# Patient Record
Sex: FEMALE | Race: WHITE | NOT HISPANIC OR LATINO | Employment: UNEMPLOYED | URBAN - METROPOLITAN AREA
[De-identification: names, ages, dates, MRNs, and addresses within clinical notes are randomized per-mention and may not be internally consistent; named-entity substitution may affect disease eponyms.]

---

## 2017-12-06 ENCOUNTER — HOSPITAL ENCOUNTER (OUTPATIENT)
Dept: LAB | Facility: MEDICAL CENTER | Age: 52
End: 2017-12-06
Attending: FAMILY MEDICINE
Payer: MEDICAID

## 2017-12-06 LAB
ALBUMIN SERPL BCP-MCNC: 4.3 G/DL (ref 3.2–4.9)
ALBUMIN/GLOB SERPL: 1.7 G/DL
ALP SERPL-CCNC: 91 U/L (ref 30–99)
ALT SERPL-CCNC: 15 U/L (ref 2–50)
ANION GAP SERPL CALC-SCNC: 5 MMOL/L (ref 0–11.9)
AST SERPL-CCNC: 18 U/L (ref 12–45)
BILIRUB SERPL-MCNC: 0.4 MG/DL (ref 0.1–1.5)
BUN SERPL-MCNC: 29 MG/DL (ref 8–22)
CALCIUM SERPL-MCNC: 9.8 MG/DL (ref 8.5–10.5)
CHLORIDE SERPL-SCNC: 107 MMOL/L (ref 96–112)
CHOLEST SERPL-MCNC: 137 MG/DL (ref 100–199)
CO2 SERPL-SCNC: 28 MMOL/L (ref 20–33)
CREAT SERPL-MCNC: 1.33 MG/DL (ref 0.5–1.4)
CREAT UR-MCNC: 177.8 MG/DL
EST. AVERAGE GLUCOSE BLD GHB EST-MCNC: 160 MG/DL
GFR SERPL CREATININE-BSD FRML MDRD: 42 ML/MIN/1.73 M 2
GLOBULIN SER CALC-MCNC: 2.5 G/DL (ref 1.9–3.5)
GLUCOSE SERPL-MCNC: 144 MG/DL (ref 65–99)
HBA1C MFR BLD: 7.2 % (ref 0–5.6)
HDLC SERPL-MCNC: 59 MG/DL
HIV 1+2 AB+HIV1 P24 AG SERPL QL IA: NON REACTIVE
LDLC SERPL CALC-MCNC: 54 MG/DL
MICROALBUMIN UR-MCNC: 0.9 MG/DL
MICROALBUMIN/CREAT UR: 5 MG/G (ref 0–30)
POTASSIUM SERPL-SCNC: 4.2 MMOL/L (ref 3.6–5.5)
PROT SERPL-MCNC: 6.8 G/DL (ref 6–8.2)
SODIUM SERPL-SCNC: 140 MMOL/L (ref 135–145)
TREPONEMA PALLIDUM IGG+IGM AB [PRESENCE] IN SERUM OR PLASMA BY IMMUNOASSAY: NON REACTIVE
TRIGL SERPL-MCNC: 120 MG/DL (ref 0–149)
TSH SERPL DL<=0.005 MIU/L-ACNC: 1.49 UIU/ML (ref 0.3–3.7)

## 2017-12-06 PROCEDURE — 82043 UR ALBUMIN QUANTITATIVE: CPT

## 2017-12-06 PROCEDURE — 86780 TREPONEMA PALLIDUM: CPT

## 2017-12-06 PROCEDURE — 83036 HEMOGLOBIN GLYCOSYLATED A1C: CPT

## 2017-12-06 PROCEDURE — 82570 ASSAY OF URINE CREATININE: CPT

## 2017-12-06 PROCEDURE — 84443 ASSAY THYROID STIM HORMONE: CPT

## 2017-12-06 PROCEDURE — 36415 COLL VENOUS BLD VENIPUNCTURE: CPT

## 2017-12-06 PROCEDURE — 87522 HEPATITIS C REVRS TRNSCRPJ: CPT

## 2017-12-06 PROCEDURE — 87389 HIV-1 AG W/HIV-1&-2 AB AG IA: CPT

## 2017-12-06 PROCEDURE — 80061 LIPID PANEL: CPT

## 2017-12-06 PROCEDURE — 80053 COMPREHEN METABOLIC PANEL: CPT

## 2017-12-09 LAB
HCV RNA SERPL NAA+PROBE-ACNC: <15 IU/ML
HCV RNA SERPL NAA+PROBE-LOG IU: <1.2 LOG IU
HCV RNA SERPL QL NAA+PROBE: NOT DETECTED
PATHOLOGY STUDY: NORMAL

## 2017-12-31 ENCOUNTER — HOSPITAL ENCOUNTER (EMERGENCY)
Dept: HOSPITAL 8 - ED | Age: 52
Discharge: HOME | End: 2017-12-31
Payer: MEDICAID

## 2017-12-31 ENCOUNTER — HOSPITAL ENCOUNTER (EMERGENCY)
Facility: MEDICAL CENTER | Age: 52
End: 2017-12-31
Payer: MEDICAID

## 2017-12-31 VITALS
BODY MASS INDEX: 33.81 KG/M2 | HEIGHT: 67 IN | WEIGHT: 215.39 LBS | DIASTOLIC BLOOD PRESSURE: 57 MMHG | TEMPERATURE: 98.3 F | RESPIRATION RATE: 18 BRPM | OXYGEN SATURATION: 96 % | SYSTOLIC BLOOD PRESSURE: 112 MMHG | HEART RATE: 112 BPM

## 2017-12-31 VITALS — SYSTOLIC BLOOD PRESSURE: 104 MMHG | DIASTOLIC BLOOD PRESSURE: 67 MMHG

## 2017-12-31 VITALS — WEIGHT: 215.83 LBS | HEIGHT: 67 IN | BODY MASS INDEX: 33.88 KG/M2

## 2017-12-31 DIAGNOSIS — I89.1: ICD-10-CM

## 2017-12-31 DIAGNOSIS — E11.9: ICD-10-CM

## 2017-12-31 DIAGNOSIS — L03.114: Primary | ICD-10-CM

## 2017-12-31 DIAGNOSIS — I10: ICD-10-CM

## 2017-12-31 DIAGNOSIS — G89.29: ICD-10-CM

## 2017-12-31 PROCEDURE — 96372 THER/PROPH/DIAG INJ SC/IM: CPT

## 2017-12-31 PROCEDURE — 99283 EMERGENCY DEPT VISIT LOW MDM: CPT

## 2017-12-31 PROCEDURE — 302449 STATCHG TRIAGE ONLY (STATISTIC)

## 2017-12-31 RX ORDER — IBUPROFEN 800 MG/1
800 TABLET ORAL EVERY 8 HOURS PRN
Status: SHIPPED | COMMUNITY
End: 2021-10-08

## 2017-12-31 RX ORDER — HYDROCODONE BITARTRATE AND ACETAMINOPHEN 5; 325 MG/1; MG/1
1-2 TABLET ORAL EVERY 4 HOURS PRN
Status: SHIPPED | COMMUNITY
End: 2021-10-08

## 2017-12-31 RX ORDER — TIZANIDINE 4 MG/1
4 TABLET ORAL EVERY 6 HOURS PRN
Status: SHIPPED | COMMUNITY
End: 2022-11-04

## 2017-12-31 ASSESSMENT — PAIN SCALES - GENERAL: PAINLEVEL_OUTOF10: 8

## 2018-01-01 ENCOUNTER — HOSPITAL ENCOUNTER (INPATIENT)
Dept: HOSPITAL 8 - ED | Age: 53
LOS: 4 days | Discharge: HOME | DRG: 603 | End: 2018-01-05
Attending: FAMILY MEDICINE | Admitting: FAMILY MEDICINE
Payer: MEDICAID

## 2018-01-01 VITALS — HEIGHT: 67 IN | BODY MASS INDEX: 36.02 KG/M2 | WEIGHT: 229.5 LBS

## 2018-01-01 VITALS — DIASTOLIC BLOOD PRESSURE: 74 MMHG | SYSTOLIC BLOOD PRESSURE: 116 MMHG

## 2018-01-01 DIAGNOSIS — Z82.49: ICD-10-CM

## 2018-01-01 DIAGNOSIS — Z80.0: ICD-10-CM

## 2018-01-01 DIAGNOSIS — N17.9: ICD-10-CM

## 2018-01-01 DIAGNOSIS — G56.00: ICD-10-CM

## 2018-01-01 DIAGNOSIS — Z98.51: ICD-10-CM

## 2018-01-01 DIAGNOSIS — I10: ICD-10-CM

## 2018-01-01 DIAGNOSIS — G89.29: ICD-10-CM

## 2018-01-01 DIAGNOSIS — Z79.4: ICD-10-CM

## 2018-01-01 DIAGNOSIS — E11.65: ICD-10-CM

## 2018-01-01 DIAGNOSIS — Z80.8: ICD-10-CM

## 2018-01-01 DIAGNOSIS — M19.90: ICD-10-CM

## 2018-01-01 DIAGNOSIS — F17.200: ICD-10-CM

## 2018-01-01 DIAGNOSIS — L03.114: Primary | ICD-10-CM
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
## 2018-01-01 DIAGNOSIS — S69.92XA: ICD-10-CM

## 2018-01-01 DIAGNOSIS — I89.1: ICD-10-CM

## 2018-01-01 DIAGNOSIS — M54.5: ICD-10-CM

## 2018-01-01 DIAGNOSIS — K59.09: ICD-10-CM

## 2018-01-01 DIAGNOSIS — M54.2: ICD-10-CM

## 2018-01-01 DIAGNOSIS — F12.90: ICD-10-CM

## 2018-01-01 LAB
ALBUMIN SERPL-MCNC: 3 G/DL (ref 3.4–5)
ALP SERPL-CCNC: 89 U/L (ref 45–117)
ALT SERPL-CCNC: 30 U/L (ref 12–78)
ANION GAP SERPL CALC-SCNC: 6 MMOL/L (ref 5–15)
BASOPHILS # BLD AUTO: 0.03 X10^3/UL (ref 0–0.1)
BASOPHILS NFR BLD AUTO: 0 % (ref 0–1)
BILIRUB SERPL-MCNC: 0.4 MG/DL (ref 0.2–1)
CALCIUM SERPL-MCNC: 8.4 MG/DL (ref 8.5–10.1)
CHLORIDE SERPL-SCNC: 107 MMOL/L (ref 98–107)
CREAT SERPL-MCNC: 1.44 MG/DL (ref 0.55–1.02)
EOSINOPHIL # BLD AUTO: 0 X10^3/UL (ref 0–0.4)
EOSINOPHIL NFR BLD AUTO: 0 % (ref 1–7)
ERYTHROCYTE [DISTWIDTH] IN BLOOD BY AUTOMATED COUNT: 13.5 % (ref 9.6–15.2)
LYMPHOCYTES # BLD AUTO: 2.11 X10^3/UL (ref 1–3.4)
LYMPHOCYTES NFR BLD AUTO: 17 % (ref 22–44)
MCH RBC QN AUTO: 27.2 PG (ref 27–34.8)
MCHC RBC AUTO-ENTMCNC: 33 G/DL (ref 32.4–35.8)
MCV RBC AUTO: 82.5 FL (ref 80–100)
MD: NO
MONOCYTES # BLD AUTO: 0.61 X10^3/UL (ref 0.2–0.8)
MONOCYTES NFR BLD AUTO: 5 % (ref 2–9)
NEUTROPHILS # BLD AUTO: 9.73 X10^3/UL (ref 1.8–6.8)
NEUTROPHILS NFR BLD AUTO: 78 % (ref 42–75)
PLATELET # BLD AUTO: 181 X10^3/UL (ref 130–400)
PMV BLD AUTO: 9.2 FL (ref 7.4–10.4)
PROT SERPL-MCNC: 6.6 G/DL (ref 6.4–8.2)
RBC # BLD AUTO: 3.99 X10^6/UL (ref 3.82–5.3)

## 2018-01-01 PROCEDURE — 82550 ASSAY OF CK (CPK): CPT

## 2018-01-01 PROCEDURE — 80053 COMPREHEN METABOLIC PANEL: CPT

## 2018-01-01 PROCEDURE — 96374 THER/PROPH/DIAG INJ IV PUSH: CPT

## 2018-01-01 PROCEDURE — 96375 TX/PRO/DX INJ NEW DRUG ADDON: CPT

## 2018-01-01 PROCEDURE — 86140 C-REACTIVE PROTEIN: CPT

## 2018-01-01 PROCEDURE — 87040 BLOOD CULTURE FOR BACTERIA: CPT

## 2018-01-01 PROCEDURE — 82962 GLUCOSE BLOOD TEST: CPT

## 2018-01-01 PROCEDURE — 80202 ASSAY OF VANCOMYCIN: CPT

## 2018-01-01 PROCEDURE — 83605 ASSAY OF LACTIC ACID: CPT

## 2018-01-01 PROCEDURE — 80048 BASIC METABOLIC PNL TOTAL CA: CPT

## 2018-01-01 PROCEDURE — 36415 COLL VENOUS BLD VENIPUNCTURE: CPT

## 2018-01-01 PROCEDURE — 85025 COMPLETE CBC W/AUTO DIFF WBC: CPT

## 2018-01-01 RX ADMIN — SODIUM CHLORIDE SCH MLS/HR: 0.9 INJECTION, SOLUTION INTRAVENOUS at 23:55

## 2018-01-01 NOTE — ED NOTES
Chief Complaint   Patient presents with   • Wound Infection     left hand since yesterday. Worsening this afternoon, extending proximally to mid forearm     +chills. Afebrile, tachycardic otherwise VSS. Explained triage process, to waiting room. Asked to inform RN if questions or concerns arise.

## 2018-01-02 VITALS — DIASTOLIC BLOOD PRESSURE: 54 MMHG | SYSTOLIC BLOOD PRESSURE: 90 MMHG

## 2018-01-02 VITALS — SYSTOLIC BLOOD PRESSURE: 132 MMHG | DIASTOLIC BLOOD PRESSURE: 79 MMHG

## 2018-01-02 VITALS — DIASTOLIC BLOOD PRESSURE: 81 MMHG | SYSTOLIC BLOOD PRESSURE: 132 MMHG

## 2018-01-02 VITALS — SYSTOLIC BLOOD PRESSURE: 119 MMHG | DIASTOLIC BLOOD PRESSURE: 72 MMHG

## 2018-01-02 LAB
ANION GAP SERPL CALC-SCNC: 6 MMOL/L (ref 5–15)
BASOPHILS # BLD AUTO: 0.03 X10^3/UL (ref 0–0.1)
BASOPHILS NFR BLD AUTO: 0 % (ref 0–1)
CALCIUM SERPL-MCNC: 8.3 MG/DL (ref 8.5–10.1)
CHLORIDE SERPL-SCNC: 110 MMOL/L (ref 98–107)
CREAT SERPL-MCNC: 1.43 MG/DL (ref 0.55–1.02)
EOSINOPHIL # BLD AUTO: 0.34 X10^3/UL (ref 0–0.4)
EOSINOPHIL NFR BLD AUTO: 3 % (ref 1–7)
ERYTHROCYTE [DISTWIDTH] IN BLOOD BY AUTOMATED COUNT: 13.8 % (ref 9.6–15.2)
LYMPHOCYTES # BLD AUTO: 3.07 X10^3/UL (ref 1–3.4)
LYMPHOCYTES NFR BLD AUTO: 28 % (ref 22–44)
MCH RBC QN AUTO: 27.3 PG (ref 27–34.8)
MCHC RBC AUTO-ENTMCNC: 32.7 G/DL (ref 32.4–35.8)
MCV RBC AUTO: 83.4 FL (ref 80–100)
MD: NO
MONOCYTES # BLD AUTO: 0.68 X10^3/UL (ref 0.2–0.8)
MONOCYTES NFR BLD AUTO: 6 % (ref 2–9)
NEUTROPHILS # BLD AUTO: 6.92 X10^3/UL (ref 1.8–6.8)
NEUTROPHILS NFR BLD AUTO: 63 % (ref 42–75)
PLATELET # BLD AUTO: 171 X10^3/UL (ref 130–400)
PMV BLD AUTO: 9.2 FL (ref 7.4–10.4)
RBC # BLD AUTO: 4 X10^6/UL (ref 3.82–5.3)

## 2018-01-02 RX ADMIN — NICOTINE SCH PATCH: 7 PATCH, EXTENDED RELEASE TRANSDERMAL at 00:26

## 2018-01-02 RX ADMIN — PREGABALIN SCH MG: 200 CAPSULE ORAL at 00:26

## 2018-01-02 RX ADMIN — SODIUM CHLORIDE SCH MLS/HR: 0.9 INJECTION, SOLUTION INTRAVENOUS at 09:46

## 2018-01-02 RX ADMIN — CEFAZOLIN SODIUM SCH MLS/HR: 1 SOLUTION INTRAVENOUS at 13:49

## 2018-01-02 RX ADMIN — SODIUM CHLORIDE, PRESERVATIVE FREE SCH ML: 5 INJECTION INTRAVENOUS at 21:29

## 2018-01-02 RX ADMIN — NICOTINE SCH PATCH: 7 PATCH, EXTENDED RELEASE TRANSDERMAL at 23:55

## 2018-01-02 RX ADMIN — PREGABALIN SCH MG: 200 CAPSULE ORAL at 21:28

## 2018-01-02 RX ADMIN — INSULIN ASPART SCH UNITS: 100 INJECTION, SOLUTION INTRAVENOUS; SUBCUTANEOUS at 21:00

## 2018-01-02 RX ADMIN — PREGABALIN SCH MG: 150 CAPSULE ORAL at 00:26

## 2018-01-02 RX ADMIN — ENOXAPARIN SODIUM SCH MG: 40 INJECTION SUBCUTANEOUS at 23:55

## 2018-01-02 RX ADMIN — ENOXAPARIN SODIUM SCH MG: 40 INJECTION SUBCUTANEOUS at 00:26

## 2018-01-02 RX ADMIN — PREGABALIN SCH MG: 150 CAPSULE ORAL at 21:28

## 2018-01-02 RX ADMIN — INSULIN ASPART SCH UNITS: 100 INJECTION, SOLUTION INTRAVENOUS; SUBCUTANEOUS at 07:53

## 2018-01-02 RX ADMIN — CEFAZOLIN SODIUM SCH MLS/HR: 1 SOLUTION INTRAVENOUS at 06:16

## 2018-01-02 RX ADMIN — VANCOMYCIN HYDROCHLORIDE SCH MLS/HR: 1 INJECTION, POWDER, LYOPHILIZED, FOR SOLUTION INTRAVENOUS at 23:03

## 2018-01-02 RX ADMIN — INSULIN DETEMIR SCH UNITS: 100 INJECTION, SOLUTION SUBCUTANEOUS at 12:19

## 2018-01-02 RX ADMIN — SODIUM CHLORIDE, PRESERVATIVE FREE SCH ML: 5 INJECTION INTRAVENOUS at 09:00

## 2018-01-02 RX ADMIN — CEFAZOLIN SODIUM SCH MLS/HR: 1 SOLUTION INTRAVENOUS at 22:06

## 2018-01-03 VITALS — DIASTOLIC BLOOD PRESSURE: 82 MMHG | SYSTOLIC BLOOD PRESSURE: 132 MMHG

## 2018-01-03 VITALS — SYSTOLIC BLOOD PRESSURE: 108 MMHG | DIASTOLIC BLOOD PRESSURE: 66 MMHG

## 2018-01-03 VITALS — SYSTOLIC BLOOD PRESSURE: 133 MMHG | DIASTOLIC BLOOD PRESSURE: 88 MMHG

## 2018-01-03 VITALS — SYSTOLIC BLOOD PRESSURE: 120 MMHG | DIASTOLIC BLOOD PRESSURE: 80 MMHG

## 2018-01-03 LAB
ANION GAP SERPL CALC-SCNC: 7 MMOL/L (ref 5–15)
CALCIUM SERPL-MCNC: 8.2 MG/DL (ref 8.5–10.1)
CHLORIDE SERPL-SCNC: 114 MMOL/L (ref 98–107)
CREAT SERPL-MCNC: 0.87 MG/DL (ref 0.55–1.02)

## 2018-01-03 RX ADMIN — INSULIN ASPART SCH UNITS: 100 INJECTION, SOLUTION INTRAVENOUS; SUBCUTANEOUS at 09:00

## 2018-01-03 RX ADMIN — SODIUM CHLORIDE, PRESERVATIVE FREE SCH ML: 5 INJECTION INTRAVENOUS at 20:48

## 2018-01-03 RX ADMIN — INSULIN DETEMIR SCH UNITS: 100 INJECTION, SOLUTION SUBCUTANEOUS at 20:08

## 2018-01-03 RX ADMIN — VANCOMYCIN HYDROCHLORIDE SCH MLS/HR: 1 INJECTION, POWDER, LYOPHILIZED, FOR SOLUTION INTRAVENOUS at 23:02

## 2018-01-03 RX ADMIN — CEFAZOLIN SODIUM SCH MLS/HR: 1 SOLUTION INTRAVENOUS at 06:13

## 2018-01-03 RX ADMIN — PREGABALIN SCH MG: 150 CAPSULE ORAL at 20:47

## 2018-01-03 RX ADMIN — INSULIN ASPART SCH UNITS: 100 INJECTION, SOLUTION INTRAVENOUS; SUBCUTANEOUS at 20:46

## 2018-01-03 RX ADMIN — CEFAZOLIN SODIUM SCH MLS/HR: 1 SOLUTION INTRAVENOUS at 13:58

## 2018-01-03 RX ADMIN — INSULIN DETEMIR SCH UNITS: 100 INJECTION, SOLUTION SUBCUTANEOUS at 00:02

## 2018-01-03 RX ADMIN — SODIUM CHLORIDE, PRESERVATIVE FREE SCH ML: 5 INJECTION INTRAVENOUS at 09:00

## 2018-01-03 RX ADMIN — ENOXAPARIN SODIUM SCH MG: 40 INJECTION SUBCUTANEOUS at 23:39

## 2018-01-03 RX ADMIN — PREGABALIN SCH MG: 200 CAPSULE ORAL at 20:47

## 2018-01-03 RX ADMIN — NICOTINE SCH PATCH: 7 PATCH, EXTENDED RELEASE TRANSDERMAL at 23:39

## 2018-01-03 RX ADMIN — CEFAZOLIN SODIUM SCH MLS/HR: 1 SOLUTION INTRAVENOUS at 21:41

## 2018-01-04 VITALS — DIASTOLIC BLOOD PRESSURE: 85 MMHG | SYSTOLIC BLOOD PRESSURE: 128 MMHG

## 2018-01-04 VITALS — SYSTOLIC BLOOD PRESSURE: 108 MMHG | DIASTOLIC BLOOD PRESSURE: 69 MMHG

## 2018-01-04 VITALS — DIASTOLIC BLOOD PRESSURE: 53 MMHG | SYSTOLIC BLOOD PRESSURE: 134 MMHG

## 2018-01-04 VITALS — DIASTOLIC BLOOD PRESSURE: 53 MMHG | SYSTOLIC BLOOD PRESSURE: 124 MMHG

## 2018-01-04 RX ADMIN — SODIUM CHLORIDE, PRESERVATIVE FREE SCH ML: 5 INJECTION INTRAVENOUS at 09:03

## 2018-01-04 RX ADMIN — PREGABALIN SCH MG: 200 CAPSULE ORAL at 21:58

## 2018-01-04 RX ADMIN — SODIUM CHLORIDE, PRESERVATIVE FREE SCH ML: 5 INJECTION INTRAVENOUS at 21:58

## 2018-01-04 RX ADMIN — INSULIN DETEMIR SCH UNITS: 100 INJECTION, SOLUTION SUBCUTANEOUS at 21:58

## 2018-01-04 RX ADMIN — VANCOMYCIN HYDROCHLORIDE SCH MLS/HR: 1 INJECTION, POWDER, LYOPHILIZED, FOR SOLUTION INTRAVENOUS at 23:55

## 2018-01-04 RX ADMIN — INSULIN ASPART SCH UNITS: 100 INJECTION, SOLUTION INTRAVENOUS; SUBCUTANEOUS at 21:59

## 2018-01-04 RX ADMIN — CEFAZOLIN SODIUM SCH MLS/HR: 1 SOLUTION INTRAVENOUS at 22:07

## 2018-01-04 RX ADMIN — ENOXAPARIN SODIUM SCH MG: 40 INJECTION SUBCUTANEOUS at 23:55

## 2018-01-04 RX ADMIN — CEFAZOLIN SODIUM SCH MLS/HR: 1 SOLUTION INTRAVENOUS at 14:14

## 2018-01-04 RX ADMIN — INSULIN DETEMIR SCH UNITS: 100 INJECTION, SOLUTION SUBCUTANEOUS at 09:02

## 2018-01-04 RX ADMIN — CEFAZOLIN SODIUM SCH MLS/HR: 1 SOLUTION INTRAVENOUS at 06:21

## 2018-01-04 RX ADMIN — PREGABALIN SCH MG: 150 CAPSULE ORAL at 21:58

## 2018-01-04 RX ADMIN — NICOTINE SCH PATCH: 7 PATCH, EXTENDED RELEASE TRANSDERMAL at 23:30

## 2018-01-04 RX ADMIN — INSULIN ASPART SCH UNITS: 100 INJECTION, SOLUTION INTRAVENOUS; SUBCUTANEOUS at 09:00

## 2018-01-05 VITALS — SYSTOLIC BLOOD PRESSURE: 122 MMHG | DIASTOLIC BLOOD PRESSURE: 79 MMHG

## 2018-01-05 VITALS — SYSTOLIC BLOOD PRESSURE: 113 MMHG | DIASTOLIC BLOOD PRESSURE: 74 MMHG

## 2018-01-05 RX ADMIN — CEFAZOLIN SODIUM SCH MLS/HR: 1 SOLUTION INTRAVENOUS at 06:25

## 2018-01-05 RX ADMIN — INSULIN DETEMIR SCH UNITS: 100 INJECTION, SOLUTION SUBCUTANEOUS at 06:58

## 2018-01-05 RX ADMIN — INSULIN ASPART SCH UNITS: 100 INJECTION, SOLUTION INTRAVENOUS; SUBCUTANEOUS at 08:48

## 2018-01-05 RX ADMIN — SODIUM CHLORIDE, PRESERVATIVE FREE SCH ML: 5 INJECTION INTRAVENOUS at 09:00

## 2018-05-11 ENCOUNTER — HOSPITAL ENCOUNTER (OUTPATIENT)
Dept: LAB | Facility: MEDICAL CENTER | Age: 53
End: 2018-05-11
Attending: FAMILY MEDICINE
Payer: MEDICAID

## 2018-05-11 LAB
ALBUMIN SERPL BCP-MCNC: 4 G/DL (ref 3.2–4.9)
ALBUMIN/GLOB SERPL: 1.7 G/DL
ALP SERPL-CCNC: 75 U/L (ref 30–99)
ALT SERPL-CCNC: 20 U/L (ref 2–50)
ANION GAP SERPL CALC-SCNC: 4 MMOL/L (ref 0–11.9)
AST SERPL-CCNC: 22 U/L (ref 12–45)
BILIRUB SERPL-MCNC: 0.4 MG/DL (ref 0.1–1.5)
BUN SERPL-MCNC: 17 MG/DL (ref 8–22)
CALCIUM SERPL-MCNC: 9.2 MG/DL (ref 8.5–10.5)
CHLORIDE SERPL-SCNC: 112 MMOL/L (ref 96–112)
CHOLEST SERPL-MCNC: 149 MG/DL (ref 100–199)
CO2 SERPL-SCNC: 26 MMOL/L (ref 20–33)
CREAT SERPL-MCNC: 0.87 MG/DL (ref 0.5–1.4)
CREAT UR-MCNC: 235.3 MG/DL
CRP SERPL HS-MCNC: 0.3 MG/DL (ref 0–0.75)
ERYTHROCYTE [SEDIMENTATION RATE] IN BLOOD BY WESTERGREN METHOD: 14 MM/HOUR (ref 0–30)
GLOBULIN SER CALC-MCNC: 2.3 G/DL (ref 1.9–3.5)
GLUCOSE SERPL-MCNC: 107 MG/DL (ref 65–99)
HDLC SERPL-MCNC: 44 MG/DL
LDLC SERPL CALC-MCNC: 79 MG/DL
MICROALBUMIN UR-MCNC: <0.7 MG/DL
MICROALBUMIN/CREAT UR: NORMAL MG/G (ref 0–30)
POTASSIUM SERPL-SCNC: 3.7 MMOL/L (ref 3.6–5.5)
PROT SERPL-MCNC: 6.3 G/DL (ref 6–8.2)
RHEUMATOID FACT SER IA-ACNC: <10 IU/ML (ref 0–14)
SODIUM SERPL-SCNC: 142 MMOL/L (ref 135–145)
TRIGL SERPL-MCNC: 131 MG/DL (ref 0–149)
TSH SERPL DL<=0.005 MIU/L-ACNC: 1.18 UIU/ML (ref 0.38–5.33)

## 2018-05-11 PROCEDURE — 83036 HEMOGLOBIN GLYCOSYLATED A1C: CPT

## 2018-05-11 PROCEDURE — 85652 RBC SED RATE AUTOMATED: CPT

## 2018-05-11 PROCEDURE — 82043 UR ALBUMIN QUANTITATIVE: CPT

## 2018-05-11 PROCEDURE — 82570 ASSAY OF URINE CREATININE: CPT

## 2018-05-11 PROCEDURE — 36415 COLL VENOUS BLD VENIPUNCTURE: CPT

## 2018-05-11 PROCEDURE — 84443 ASSAY THYROID STIM HORMONE: CPT

## 2018-05-11 PROCEDURE — 86200 CCP ANTIBODY: CPT

## 2018-05-11 PROCEDURE — 86038 ANTINUCLEAR ANTIBODIES: CPT

## 2018-05-11 PROCEDURE — 86431 RHEUMATOID FACTOR QUANT: CPT

## 2018-05-11 PROCEDURE — 80053 COMPREHEN METABOLIC PANEL: CPT

## 2018-05-11 PROCEDURE — 86140 C-REACTIVE PROTEIN: CPT

## 2018-05-11 PROCEDURE — 80061 LIPID PANEL: CPT

## 2018-05-12 LAB
EST. AVERAGE GLUCOSE BLD GHB EST-MCNC: 223 MG/DL
HBA1C MFR BLD: 9.4 % (ref 0–5.6)

## 2018-05-13 LAB
CCP IGG SERPL-ACNC: 3 UNITS (ref 0–19)
NUCLEAR IGG SER QL IA: NORMAL

## 2019-05-04 ENCOUNTER — HOSPITAL ENCOUNTER (EMERGENCY)
Dept: HOSPITAL 8 - ED | Age: 54
Discharge: HOME | End: 2019-05-04
Payer: MEDICAID

## 2019-05-04 VITALS — WEIGHT: 194.01 LBS | HEIGHT: 66 IN | BODY MASS INDEX: 31.18 KG/M2

## 2019-05-04 VITALS — DIASTOLIC BLOOD PRESSURE: 74 MMHG | SYSTOLIC BLOOD PRESSURE: 123 MMHG

## 2019-05-04 DIAGNOSIS — J20.9: Primary | ICD-10-CM

## 2019-05-04 DIAGNOSIS — F17.200: ICD-10-CM

## 2019-05-04 DIAGNOSIS — R07.89: ICD-10-CM

## 2019-05-04 DIAGNOSIS — G89.29: ICD-10-CM

## 2019-05-04 DIAGNOSIS — I10: ICD-10-CM

## 2019-05-04 DIAGNOSIS — E11.9: ICD-10-CM

## 2019-05-04 LAB
ALBUMIN SERPL-MCNC: 3.2 G/DL (ref 3.4–5)
ALP SERPL-CCNC: 106 U/L (ref 45–117)
ALT SERPL-CCNC: 28 U/L (ref 12–78)
ANION GAP SERPL CALC-SCNC: 8 MMOL/L (ref 5–15)
BASOPHILS # BLD AUTO: 0.02 X10^3/UL (ref 0–0.1)
BASOPHILS NFR BLD AUTO: 0 % (ref 0–1)
BILIRUB SERPL-MCNC: < 0.1 MG/DL (ref 0.2–1)
CALCIUM SERPL-MCNC: 8.1 MG/DL (ref 8.5–10.1)
CHLORIDE SERPL-SCNC: 114 MMOL/L (ref 98–107)
CREAT SERPL-MCNC: 1.07 MG/DL (ref 0.55–1.02)
EOSINOPHIL # BLD AUTO: 0.05 X10^3/UL (ref 0–0.4)
EOSINOPHIL NFR BLD AUTO: 1 % (ref 1–7)
ERYTHROCYTE [DISTWIDTH] IN BLOOD BY AUTOMATED COUNT: 16.4 % (ref 9.6–15.2)
LYMPHOCYTES # BLD AUTO: 1.37 X10^3/UL (ref 1–3.4)
LYMPHOCYTES NFR BLD AUTO: 26 % (ref 22–44)
MCH RBC QN AUTO: 27.7 PG (ref 27–34.8)
MCHC RBC AUTO-ENTMCNC: 33.3 G/DL (ref 32.4–35.8)
MCV RBC AUTO: 83.2 FL (ref 80–100)
MD: NO
MONOCYTES # BLD AUTO: 0.46 X10^3/UL (ref 0.2–0.8)
MONOCYTES NFR BLD AUTO: 9 % (ref 2–9)
NEUTROPHILS # BLD AUTO: 3.34 X10^3/UL (ref 1.8–6.8)
NEUTROPHILS NFR BLD AUTO: 64 % (ref 42–75)
PLATELET # BLD AUTO: 223 X10^3/UL (ref 130–400)
PMV BLD AUTO: 8.7 FL (ref 7.4–10.4)
PROT SERPL-MCNC: 6.1 G/DL (ref 6.4–8.2)
RBC # BLD AUTO: 3.96 X10^6/UL (ref 3.82–5.3)
TROPONIN I SERPL-MCNC: 0.02 NG/ML (ref 0–0.04)

## 2019-05-04 PROCEDURE — 94640 AIRWAY INHALATION TREATMENT: CPT

## 2019-05-04 PROCEDURE — 84484 ASSAY OF TROPONIN QUANT: CPT

## 2019-05-04 PROCEDURE — 93005 ELECTROCARDIOGRAM TRACING: CPT

## 2019-05-04 PROCEDURE — 71046 X-RAY EXAM CHEST 2 VIEWS: CPT

## 2019-05-04 PROCEDURE — 85025 COMPLETE CBC W/AUTO DIFF WBC: CPT

## 2019-05-04 PROCEDURE — 99284 EMERGENCY DEPT VISIT MOD MDM: CPT

## 2019-05-04 PROCEDURE — 80053 COMPREHEN METABOLIC PANEL: CPT

## 2019-05-04 PROCEDURE — 36415 COLL VENOUS BLD VENIPUNCTURE: CPT

## 2019-06-20 ENCOUNTER — HOSPITAL ENCOUNTER (INPATIENT)
Dept: HOSPITAL 8 - ED | Age: 54
LOS: 2 days | Discharge: HOME | DRG: 871 | End: 2019-06-22
Attending: HOSPITALIST | Admitting: HOSPITALIST
Payer: MEDICAID

## 2019-06-20 VITALS — SYSTOLIC BLOOD PRESSURE: 107 MMHG | DIASTOLIC BLOOD PRESSURE: 62 MMHG

## 2019-06-20 VITALS — HEIGHT: 66 IN | BODY MASS INDEX: 30.33 KG/M2 | WEIGHT: 188.72 LBS

## 2019-06-20 DIAGNOSIS — I10: ICD-10-CM

## 2019-06-20 DIAGNOSIS — Z98.84: ICD-10-CM

## 2019-06-20 DIAGNOSIS — Z98.51: ICD-10-CM

## 2019-06-20 DIAGNOSIS — Z90.89: ICD-10-CM

## 2019-06-20 DIAGNOSIS — A41.9: Primary | ICD-10-CM

## 2019-06-20 DIAGNOSIS — D50.9: ICD-10-CM

## 2019-06-20 DIAGNOSIS — N39.0: ICD-10-CM

## 2019-06-20 DIAGNOSIS — Z98.891: ICD-10-CM

## 2019-06-20 DIAGNOSIS — G89.29: ICD-10-CM

## 2019-06-20 DIAGNOSIS — M54.5: ICD-10-CM

## 2019-06-20 DIAGNOSIS — N17.0: ICD-10-CM

## 2019-06-20 DIAGNOSIS — B95.1: ICD-10-CM

## 2019-06-20 DIAGNOSIS — Z88.2: ICD-10-CM

## 2019-06-20 DIAGNOSIS — A60.00: ICD-10-CM

## 2019-06-20 DIAGNOSIS — E11.65: ICD-10-CM

## 2019-06-20 LAB
ALBUMIN SERPL-MCNC: 3.9 G/DL (ref 3.4–5)
ANION GAP SERPL CALC-SCNC: 7 MMOL/L (ref 5–15)
BASOPHILS # BLD AUTO: 0.05 X10^3/UL (ref 0–0.1)
BASOPHILS NFR BLD AUTO: 0 % (ref 0–1)
CALCIUM SERPL-MCNC: 9.3 MG/DL (ref 8.5–10.1)
CHLORIDE SERPL-SCNC: 106 MMOL/L (ref 98–107)
CREAT SERPL-MCNC: 1.18 MG/DL (ref 0.55–1.02)
CULTURE INDICATED?: YES
EOSINOPHIL # BLD AUTO: 0.14 X10^3/UL (ref 0–0.4)
EOSINOPHIL NFR BLD AUTO: 1 % (ref 1–7)
ERYTHROCYTE [DISTWIDTH] IN BLOOD BY AUTOMATED COUNT: 14.3 % (ref 9.6–15.2)
EST. AVERAGE GLUCOSE BLD GHB EST-MCNC: 220 MG/DL (ref 0–126)
HBA1C MFR BLD: 9.3 % (ref 4.2–6.3)
LYMPHOCYTES # BLD AUTO: 3.44 X10^3/UL (ref 1–3.4)
LYMPHOCYTES NFR BLD AUTO: 26 % (ref 22–44)
MCH RBC QN AUTO: 26.4 PG (ref 27–34.8)
MCHC RBC AUTO-ENTMCNC: 31.8 G/DL (ref 32.4–35.8)
MCV RBC AUTO: 82.9 FL (ref 80–100)
MD: NO
MICROSCOPIC: (no result)
MONOCYTES # BLD AUTO: 0.63 X10^3/UL (ref 0.2–0.8)
MONOCYTES NFR BLD AUTO: 5 % (ref 2–9)
NEUTROPHILS # BLD AUTO: 8.84 X10^3/UL (ref 1.8–6.8)
NEUTROPHILS NFR BLD AUTO: 68 % (ref 42–75)
PLATELET # BLD AUTO: 362 X10^3/UL (ref 130–400)
PMV BLD AUTO: 9 FL (ref 7.4–10.4)
RBC # BLD AUTO: 5.46 X10^6/UL (ref 3.82–5.3)

## 2019-06-20 PROCEDURE — 36415 COLL VENOUS BLD VENIPUNCTURE: CPT

## 2019-06-20 PROCEDURE — 81001 URINALYSIS AUTO W/SCOPE: CPT

## 2019-06-20 PROCEDURE — 82962 GLUCOSE BLOOD TEST: CPT

## 2019-06-20 PROCEDURE — 76770 US EXAM ABDO BACK WALL COMP: CPT

## 2019-06-20 PROCEDURE — 87040 BLOOD CULTURE FOR BACTERIA: CPT

## 2019-06-20 PROCEDURE — 96375 TX/PRO/DX INJ NEW DRUG ADDON: CPT

## 2019-06-20 PROCEDURE — 83605 ASSAY OF LACTIC ACID: CPT

## 2019-06-20 PROCEDURE — 87205 SMEAR GRAM STAIN: CPT

## 2019-06-20 PROCEDURE — 87086 URINE CULTURE/COLONY COUNT: CPT

## 2019-06-20 PROCEDURE — 80053 COMPREHEN METABOLIC PANEL: CPT

## 2019-06-20 PROCEDURE — 80048 BASIC METABOLIC PNL TOTAL CA: CPT

## 2019-06-20 PROCEDURE — 87070 CULTURE OTHR SPECIMN AEROBIC: CPT

## 2019-06-20 PROCEDURE — 96376 TX/PRO/DX INJ SAME DRUG ADON: CPT

## 2019-06-20 PROCEDURE — 85025 COMPLETE CBC W/AUTO DIFF WBC: CPT

## 2019-06-20 PROCEDURE — 82040 ASSAY OF SERUM ALBUMIN: CPT

## 2019-06-20 PROCEDURE — 96366 THER/PROPH/DIAG IV INF ADDON: CPT

## 2019-06-20 PROCEDURE — 83036 HEMOGLOBIN GLYCOSYLATED A1C: CPT

## 2019-06-20 PROCEDURE — 96365 THER/PROPH/DIAG IV INF INIT: CPT

## 2019-06-20 RX ADMIN — HYDROCODONE BITARTRATE AND ACETAMINOPHEN PRN TAB: 5; 325 TABLET ORAL at 17:43

## 2019-06-20 RX ADMIN — ACYCLOVIR SCH MG: 800 TABLET ORAL at 20:15

## 2019-06-20 RX ADMIN — PHENAZOPYRIDINE HYDROCHLORIDE SCH MG: 200 TABLET ORAL at 20:15

## 2019-06-20 RX ADMIN — HYDROCODONE BITARTRATE AND ACETAMINOPHEN PRN TAB: 5; 325 TABLET ORAL at 22:14

## 2019-06-20 RX ADMIN — SODIUM CHLORIDE SCH MLS/HR: 0.9 INJECTION, SOLUTION INTRAVENOUS at 19:17

## 2019-06-20 RX ADMIN — HEPARIN SODIUM SCH UNITS: 5000 INJECTION, SOLUTION INTRAVENOUS; SUBCUTANEOUS at 17:43

## 2019-06-20 RX ADMIN — NICOTINE SCH PATCH: 7 PATCH, EXTENDED RELEASE TRANSDERMAL at 16:30

## 2019-06-20 RX ADMIN — INSULIN GLARGINE SCH UNITS: 100 INJECTION, SOLUTION SUBCUTANEOUS at 21:09

## 2019-06-20 RX ADMIN — GABAPENTIN SCH MG: 300 CAPSULE ORAL at 20:15

## 2019-06-21 VITALS — DIASTOLIC BLOOD PRESSURE: 74 MMHG | SYSTOLIC BLOOD PRESSURE: 128 MMHG

## 2019-06-21 VITALS — DIASTOLIC BLOOD PRESSURE: 85 MMHG | SYSTOLIC BLOOD PRESSURE: 136 MMHG

## 2019-06-21 VITALS — SYSTOLIC BLOOD PRESSURE: 132 MMHG | DIASTOLIC BLOOD PRESSURE: 74 MMHG

## 2019-06-21 VITALS — DIASTOLIC BLOOD PRESSURE: 66 MMHG | SYSTOLIC BLOOD PRESSURE: 110 MMHG

## 2019-06-21 LAB
ALBUMIN SERPL-MCNC: 3.1 G/DL (ref 3.4–5)
ALP SERPL-CCNC: 97 U/L (ref 45–117)
ALT SERPL-CCNC: 17 U/L (ref 12–78)
ANION GAP SERPL CALC-SCNC: 5 MMOL/L (ref 5–15)
BASOPHILS # BLD AUTO: 0.06 X10^3/UL (ref 0–0.1)
BASOPHILS NFR BLD AUTO: 1 % (ref 0–1)
BILIRUB SERPL-MCNC: 0.4 MG/DL (ref 0.2–1)
CALCIUM SERPL-MCNC: 8.6 MG/DL (ref 8.5–10.1)
CHLORIDE SERPL-SCNC: 112 MMOL/L (ref 98–107)
CREAT SERPL-MCNC: 0.92 MG/DL (ref 0.55–1.02)
EOSINOPHIL # BLD AUTO: 0.22 X10^3/UL (ref 0–0.4)
EOSINOPHIL NFR BLD AUTO: 2 % (ref 1–7)
ERYTHROCYTE [DISTWIDTH] IN BLOOD BY AUTOMATED COUNT: 14.2 % (ref 9.6–15.2)
LYMPHOCYTES # BLD AUTO: 4.24 X10^3/UL (ref 1–3.4)
LYMPHOCYTES NFR BLD AUTO: 44 % (ref 22–44)
MCH RBC QN AUTO: 27 PG (ref 27–34.8)
MCHC RBC AUTO-ENTMCNC: 31.8 G/DL (ref 32.4–35.8)
MCV RBC AUTO: 84.8 FL (ref 80–100)
MD: NO
MONOCYTES # BLD AUTO: 0.41 X10^3/UL (ref 0.2–0.8)
MONOCYTES NFR BLD AUTO: 4 % (ref 2–9)
NEUTROPHILS # BLD AUTO: 4.7 X10^3/UL (ref 1.8–6.8)
NEUTROPHILS NFR BLD AUTO: 49 % (ref 42–75)
PLATELET # BLD AUTO: 302 X10^3/UL (ref 130–400)
PMV BLD AUTO: 8.8 FL (ref 7.4–10.4)
PROT SERPL-MCNC: 6.3 G/DL (ref 6.4–8.2)
RBC # BLD AUTO: 4.77 X10^6/UL (ref 3.82–5.3)

## 2019-06-21 RX ADMIN — SODIUM CHLORIDE SCH MLS/HR: 0.9 INJECTION, SOLUTION INTRAVENOUS at 19:19

## 2019-06-21 RX ADMIN — ACYCLOVIR SCH MG: 800 TABLET ORAL at 18:11

## 2019-06-21 RX ADMIN — ACYCLOVIR SCH MG: 800 TABLET ORAL at 20:30

## 2019-06-21 RX ADMIN — ONDANSETRON PRN MG: 4 TABLET, ORALLY DISINTEGRATING ORAL at 08:13

## 2019-06-21 RX ADMIN — HYDROCODONE BITARTRATE AND ACETAMINOPHEN PRN TAB: 5; 325 TABLET ORAL at 06:30

## 2019-06-21 RX ADMIN — GABAPENTIN SCH MG: 300 CAPSULE ORAL at 09:13

## 2019-06-21 RX ADMIN — HYDROCODONE BITARTRATE AND ACETAMINOPHEN PRN TAB: 5; 325 TABLET ORAL at 19:19

## 2019-06-21 RX ADMIN — HEPARIN SODIUM SCH UNITS: 5000 INJECTION, SOLUTION INTRAVENOUS; SUBCUTANEOUS at 18:11

## 2019-06-21 RX ADMIN — SODIUM CHLORIDE SCH MLS/HR: 0.9 INJECTION, SOLUTION INTRAVENOUS at 05:36

## 2019-06-21 RX ADMIN — INSULIN GLARGINE SCH UNITS: 100 INJECTION, SOLUTION SUBCUTANEOUS at 09:12

## 2019-06-21 RX ADMIN — HYDROCODONE BITARTRATE AND ACETAMINOPHEN PRN TAB: 5; 325 TABLET ORAL at 14:28

## 2019-06-21 RX ADMIN — CITALOPRAM HYDROBROMIDE SCH MG: 20 TABLET ORAL at 09:13

## 2019-06-21 RX ADMIN — HYDROCODONE BITARTRATE AND ACETAMINOPHEN PRN TAB: 5; 325 TABLET ORAL at 10:28

## 2019-06-21 RX ADMIN — ONDANSETRON PRN MG: 4 TABLET, ORALLY DISINTEGRATING ORAL at 18:11

## 2019-06-21 RX ADMIN — HEPARIN SODIUM SCH UNITS: 5000 INJECTION, SOLUTION INTRAVENOUS; SUBCUTANEOUS at 00:41

## 2019-06-21 RX ADMIN — ACYCLOVIR SCH MG: 800 TABLET ORAL at 14:27

## 2019-06-21 RX ADMIN — ACYCLOVIR SCH MG: 800 TABLET ORAL at 09:13

## 2019-06-21 RX ADMIN — INSULIN GLARGINE SCH UNITS: 100 INJECTION, SOLUTION SUBCUTANEOUS at 20:38

## 2019-06-21 RX ADMIN — PHENAZOPYRIDINE HYDROCHLORIDE SCH MG: 200 TABLET ORAL at 20:30

## 2019-06-21 RX ADMIN — ACYCLOVIR SCH MG: 800 TABLET ORAL at 06:29

## 2019-06-21 RX ADMIN — HEPARIN SODIUM SCH UNITS: 5000 INJECTION, SOLUTION INTRAVENOUS; SUBCUTANEOUS at 09:12

## 2019-06-21 RX ADMIN — GABAPENTIN SCH MG: 300 CAPSULE ORAL at 20:30

## 2019-06-21 RX ADMIN — NICOTINE SCH PATCH: 7 PATCH, EXTENDED RELEASE TRANSDERMAL at 16:30

## 2019-06-21 RX ADMIN — PHENAZOPYRIDINE HYDROCHLORIDE SCH MG: 200 TABLET ORAL at 09:13

## 2019-06-21 RX ADMIN — HYDROCODONE BITARTRATE AND ACETAMINOPHEN PRN TAB: 5; 325 TABLET ORAL at 02:20

## 2019-06-22 VITALS — SYSTOLIC BLOOD PRESSURE: 131 MMHG | DIASTOLIC BLOOD PRESSURE: 78 MMHG

## 2019-06-22 VITALS — SYSTOLIC BLOOD PRESSURE: 127 MMHG | DIASTOLIC BLOOD PRESSURE: 70 MMHG

## 2019-06-22 VITALS — DIASTOLIC BLOOD PRESSURE: 74 MMHG | SYSTOLIC BLOOD PRESSURE: 131 MMHG

## 2019-06-22 LAB
ALBUMIN SERPL-MCNC: 2.9 G/DL (ref 3.4–5)
ALP SERPL-CCNC: 80 U/L (ref 45–117)
ALT SERPL-CCNC: 11 U/L (ref 12–78)
ANION GAP SERPL CALC-SCNC: 3 MMOL/L (ref 5–15)
BASOPHILS # BLD AUTO: 0.05 X10^3/UL (ref 0–0.1)
BASOPHILS NFR BLD AUTO: 1 % (ref 0–1)
BILIRUB SERPL-MCNC: 0.2 MG/DL (ref 0.2–1)
CALCIUM SERPL-MCNC: 8.5 MG/DL (ref 8.5–10.1)
CHLORIDE SERPL-SCNC: 113 MMOL/L (ref 98–107)
CREAT SERPL-MCNC: 0.83 MG/DL (ref 0.55–1.02)
EOSINOPHIL # BLD AUTO: 0.21 X10^3/UL (ref 0–0.4)
EOSINOPHIL NFR BLD AUTO: 3 % (ref 1–7)
ERYTHROCYTE [DISTWIDTH] IN BLOOD BY AUTOMATED COUNT: 14 % (ref 9.6–15.2)
LYMPHOCYTES # BLD AUTO: 3.49 X10^3/UL (ref 1–3.4)
LYMPHOCYTES NFR BLD AUTO: 47 % (ref 22–44)
MCH RBC QN AUTO: 27 PG (ref 27–34.8)
MCHC RBC AUTO-ENTMCNC: 31.9 G/DL (ref 32.4–35.8)
MCV RBC AUTO: 84.7 FL (ref 80–100)
MD: NO
MONOCYTES # BLD AUTO: 0.34 X10^3/UL (ref 0.2–0.8)
MONOCYTES NFR BLD AUTO: 5 % (ref 2–9)
NEUTROPHILS # BLD AUTO: 3.33 X10^3/UL (ref 1.8–6.8)
NEUTROPHILS NFR BLD AUTO: 45 % (ref 42–75)
PLATELET # BLD AUTO: 257 X10^3/UL (ref 130–400)
PMV BLD AUTO: 8.9 FL (ref 7.4–10.4)
PROT SERPL-MCNC: 5.8 G/DL (ref 6.4–8.2)
RBC # BLD AUTO: 4.27 X10^6/UL (ref 3.82–5.3)

## 2019-06-22 RX ADMIN — GABAPENTIN SCH MG: 300 CAPSULE ORAL at 09:07

## 2019-06-22 RX ADMIN — CITALOPRAM HYDROBROMIDE SCH MG: 20 TABLET ORAL at 09:07

## 2019-06-22 RX ADMIN — HEPARIN SODIUM SCH UNITS: 5000 INJECTION, SOLUTION INTRAVENOUS; SUBCUTANEOUS at 09:07

## 2019-06-22 RX ADMIN — ONDANSETRON PRN MG: 4 TABLET, ORALLY DISINTEGRATING ORAL at 00:45

## 2019-06-22 RX ADMIN — ACYCLOVIR SCH APPLIC: 50 OINTMENT TOPICAL at 14:00

## 2019-06-22 RX ADMIN — HYDROCODONE BITARTRATE AND ACETAMINOPHEN PRN TAB: 5; 325 TABLET ORAL at 09:07

## 2019-06-22 RX ADMIN — HEPARIN SODIUM SCH UNITS: 5000 INJECTION, SOLUTION INTRAVENOUS; SUBCUTANEOUS at 00:37

## 2019-06-22 RX ADMIN — PHENAZOPYRIDINE HYDROCHLORIDE SCH MG: 200 TABLET ORAL at 09:07

## 2019-06-22 RX ADMIN — HYDROCODONE BITARTRATE AND ACETAMINOPHEN PRN TAB: 5; 325 TABLET ORAL at 14:21

## 2019-06-22 RX ADMIN — ACYCLOVIR SCH APPLIC: 50 OINTMENT TOPICAL at 14:21

## 2019-06-22 RX ADMIN — ACYCLOVIR SCH MG: 800 TABLET ORAL at 05:12

## 2019-06-22 RX ADMIN — ACYCLOVIR SCH MG: 800 TABLET ORAL at 09:07

## 2019-06-22 RX ADMIN — HYDROCODONE BITARTRATE AND ACETAMINOPHEN PRN TAB: 5; 325 TABLET ORAL at 05:12

## 2019-06-22 RX ADMIN — HYDROCODONE BITARTRATE AND ACETAMINOPHEN PRN TAB: 5; 325 TABLET ORAL at 00:37

## 2021-10-08 ENCOUNTER — OFFICE VISIT (OUTPATIENT)
Dept: MEDICAL GROUP | Facility: CLINIC | Age: 56
End: 2021-10-08
Payer: MEDICAID

## 2021-10-08 VITALS
RESPIRATION RATE: 18 BRPM | OXYGEN SATURATION: 99 % | DIASTOLIC BLOOD PRESSURE: 87 MMHG | BODY MASS INDEX: 29.11 KG/M2 | WEIGHT: 185.5 LBS | HEART RATE: 115 BPM | TEMPERATURE: 97.6 F | SYSTOLIC BLOOD PRESSURE: 141 MMHG | HEIGHT: 67 IN

## 2021-10-08 DIAGNOSIS — E11.42 TYPE 2 DM WITH DIABETIC NEUROPATHY AFFECTING BOTH SIDES OF BODY (HCC): ICD-10-CM

## 2021-10-08 DIAGNOSIS — G62.9 NEUROPATHY: ICD-10-CM

## 2021-10-08 DIAGNOSIS — Z00.00 LABORATORY EXAM ORDERED AS PART OF ROUTINE GENERAL MEDICAL EXAMINATION: ICD-10-CM

## 2021-10-08 DIAGNOSIS — I10 PRIMARY HYPERTENSION: ICD-10-CM

## 2021-10-08 PROCEDURE — 99214 OFFICE O/P EST MOD 30 MIN: CPT | Performed by: STUDENT IN AN ORGANIZED HEALTH CARE EDUCATION/TRAINING PROGRAM

## 2021-10-08 RX ORDER — SIMVASTATIN 20 MG
20 TABLET ORAL EVERY EVENING
Qty: 30 TABLET | Refills: 11 | Status: SHIPPED | DISCHARGE
Start: 2021-10-08 | End: 2022-05-17 | Stop reason: SDUPTHER

## 2021-10-08 RX ORDER — INSULIN GLARGINE 100 [IU]/ML
20 INJECTION, SOLUTION SUBCUTANEOUS
Status: SHIPPED | DISCHARGE
Start: 2021-10-08 | End: 2022-05-17

## 2021-10-08 RX ORDER — GABAPENTIN 400 MG/1
400 CAPSULE ORAL 3 TIMES DAILY
Qty: 90 CAPSULE | Refills: 1 | Status: SHIPPED | OUTPATIENT
Start: 2021-10-08 | End: 2022-02-27

## 2021-10-08 RX ORDER — CITALOPRAM 40 MG/1
40 TABLET ORAL DAILY
Qty: 30 TABLET | Refills: 1 | Status: SHIPPED | OUTPATIENT
Start: 2021-10-08 | End: 2022-01-21

## 2021-10-08 RX ORDER — PEN NEEDLE, DIABETIC 31 G X1/4"
1 NEEDLE, DISPOSABLE MISCELLANEOUS 3 TIMES DAILY
Qty: 90 EACH | Refills: 3 | Status: SHIPPED | OUTPATIENT
Start: 2021-10-08 | End: 2022-05-17

## 2021-10-08 RX ORDER — LISINOPRIL 10 MG/1
10 TABLET ORAL DAILY
Qty: 30 TABLET | Refills: 1 | Status: SHIPPED | OUTPATIENT
Start: 2021-10-08 | End: 2022-05-17 | Stop reason: SDUPTHER

## 2021-10-08 RX ORDER — DULAGLUTIDE 0.75 MG/.5ML
0.5 INJECTION, SOLUTION SUBCUTANEOUS
Qty: 1.96 ML | Refills: 1 | Status: SHIPPED | OUTPATIENT
Start: 2021-10-08 | End: 2021-12-15 | Stop reason: SDUPTHER

## 2021-10-08 RX ORDER — ALBUTEROL SULFATE 90 UG/1
2 AEROSOL, METERED RESPIRATORY (INHALATION) EVERY 4 HOURS PRN
Qty: 1 EACH | Status: SHIPPED | DISCHARGE
Start: 2021-10-08 | End: 2022-11-04 | Stop reason: SDUPTHER

## 2021-10-08 ASSESSMENT — PATIENT HEALTH QUESTIONNAIRE - PHQ9
CLINICAL INTERPRETATION OF PHQ2 SCORE: 2
5. POOR APPETITE OR OVEREATING: 0 - NOT AT ALL
SUM OF ALL RESPONSES TO PHQ QUESTIONS 1-9: 7

## 2021-10-08 NOTE — PATIENT INSTRUCTIONS
- Stop Humalog   - Decrease lantus to 15 units at night  - Start trulicity 0.75 mg once weekly    - Stop prozac  - Start celexa

## 2021-10-08 NOTE — PROGRESS NOTES
[facility]    Chief Complaint   Patient presents with   • Medication Refill     med refills     HISTORY OF PRESENT ILLNESS: Patient is a 55 y.o. female established patient who presents today to discuss the medical issues below:    Depression: Started prozac 40 mg PO daily. Wants to switch back to celexa.  She feels she had better response to Celexa.  Denies SI/HI.    HTN: Off of BP meds for weeks. denies chest pain, shortness breath, abdominal pain, nausea, emesis, swelling, or other complaints.    Neuropathy: 300 mg Po TiD. patient reports neuropathy is severe and uncontrolled.  In the hands and bilateral feet.     D2DM: Working on diet. Cut out soda. Check glucose infrequently. Last one was 101. Backing off of short acting insulin. Lantis 22-24 qhs. Lispro 8-10 u before dinner.          No problems updated.     Patient Active Problem List    Diagnosis Date Noted   • Dehydration 08/17/2015       Allergies:Patient has no known allergies.    Current Outpatient Medications   Medication Sig Dispense Refill   • ibuprofen (MOTRIN) 800 MG Tab Take 800 mg by mouth every 8 hours as needed.     • hydrocodone-acetaminophen (NORCO) 5-325 MG Tab per tablet Take 1-2 Tabs by mouth every four hours as needed.     • tizanidine (ZANAFLEX) 4 MG Tab Take 4 mg by mouth every 6 hours as needed.     • insulin glargine (LANTUS) 100 UNIT/ML Solution Inject 20 Units as instructed 2 times a day. 10 mL 3   • lisinopril-hydrochlorothiazide (PRINZIDE, ZESTORETIC) 20-12.5 MG per tablet Take 2 Tabs by mouth every day.     • citalopram (CELEXA) 20 MG Tab Take 20 mg by mouth 3 times a day.       No current facility-administered medications for this visit.         Past Medical History:   Diagnosis Date   • Diabetes (CMS-[facility]) ([facility])    • Diabetic neuropathy, painful (CMS-[facility]) ([facility])    • Hypertension        Social History     Tobacco Use   • Smoking status: Current Every Day Smoker     Packs/day: 0.25   Substance Use Topics   • Alcohol use:  "Yes   • Drug use: No       No family status information on file.   No family history on file.    ROS:  Negative except as stated above.      Exam:   /87 (BP Location: Left arm, Patient Position: Sitting, BP Cuff Size: Adult)   Pulse (!) 115   Temp 36.4 °C (97.6 °F) (Temporal)   Resp 18   Ht 1.702 m (5' 7\")   Wt 84.1 kg (185 lb 8 oz)   SpO2 99%  Body mass index is 29.05 kg/m².  General:  Well nourished, well developed female in NAD.  HENT: MMM, EOMI   Neck: No JVD  Pulmonary: Clear to ausculation.  Normal effort. No rales, rhonchi, or wheezing.  Cardiovascular: Regular rate and rhythm without murmur.   Abdomen: Normal bowel sounds, soft and nontender, no palpable liver, spleen, or masses.  Extremities: No LE edema noted. 5/5 strength in all extremities  Neuro: Grossly nonfocal.  Psych: Alert and oriented to person, place, and time. Appropriate mood and conversation.      Assessment/Plan:    #Depression  -Patient reports poor control of symptoms on Prozac.  Discussed switching back to Celexa.  -Stop Prozac  -Start Celexa 40 mg p.o. daily  -Return to clinic in 1 month    #Hypertension, uncontrolled  -Patient reports she ran out of her lisinopril approximately 2 weeks ago and is currently not on that medication.  She does not check her blood pressure at home, however, her blood pressure is elevated in office today at 141/87.  -Resume lisinopril 10 mg p.o. daily    #Type 2 diabetes  -Patient has poorly controlled type 2 diabetes.  She is noncompliant with glucose monitoring.  She makes adjustments to her regimen based off of how she feels.  Discussed options.  I feel like a GLP-1 agonist would benefit this patient.  -Stop lispro  -Decrease Lantus to 15 units nightly  -Start Trulicity 0.75 mg subcu weekly  -Check morning fasting glucose.  -I discussed at length the risk of hypoglycemia, including but not limited to, permanent brain damage and death.  Patient expressed understanding.  -Return to clinic in 1 " month with glucose log.    #Peripheral neuropathy  -Patient reports her neuropathy is poorly controlled.  Would like better relief.  Discussed options.  -Increase gabapentin to 400 mg p.o. 3 times daily  -Return to clinic in 1 month.

## 2021-12-16 RX ORDER — DULAGLUTIDE 0.75 MG/.5ML
0.5 INJECTION, SOLUTION SUBCUTANEOUS
Qty: 1.96 ML | Refills: 1 | Status: SHIPPED | OUTPATIENT
Start: 2021-12-16 | End: 2022-01-11 | Stop reason: SDUPTHER

## 2021-12-21 ENCOUNTER — TELEPHONE (OUTPATIENT)
Dept: MEDICAL GROUP | Facility: CLINIC | Age: 56
End: 2021-12-21

## 2021-12-21 RX ORDER — LIRAGLUTIDE 6 MG/ML
INJECTION SUBCUTANEOUS
Qty: 9 ML | Refills: 1 | Status: SHIPPED | OUTPATIENT
Start: 2021-12-21 | End: 2022-01-11

## 2021-12-21 NOTE — TELEPHONE ENCOUNTER
Caller Name: Chantale Greer    Call Back Number: [phone] (home)       Patient called about her trulicity. I saw that it was sent it, I called the pharmacy to see what was going on. Patients insurance will no longer cover trulicity, It looks like she has an appointment with you soon, but her sugars have been pretty high.. So she will need a new insulin..

## 2021-12-22 ENCOUNTER — TELEPHONE (OUTPATIENT)
Dept: SCHEDULING | Facility: IMAGING CENTER | Age: 56
End: 2021-12-22

## 2021-12-22 NOTE — TELEPHONE ENCOUNTER
I called and let her know that you sent in victoza. I will follow up from here to see if her insurance covered it

## 2021-12-23 ENCOUNTER — TELEPHONE (OUTPATIENT)
Dept: MEDICAL GROUP | Facility: CLINIC | Age: 56
End: 2021-12-23
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
## 2021-12-28 ENCOUNTER — APPOINTMENT (OUTPATIENT)
Dept: MEDICAL GROUP | Facility: CLINIC | Age: 56
End: 2021-12-28
Payer: MEDICAID

## 2022-01-11 RX ORDER — DULAGLUTIDE 0.75 MG/.5ML
0.5 INJECTION, SOLUTION SUBCUTANEOUS
Qty: 1.96 ML | Refills: 1 | Status: SHIPPED | OUTPATIENT
Start: 2022-01-11 | End: 2022-05-17

## 2022-01-11 NOTE — TELEPHONE ENCOUNTER
Received request via: Pharmacy    Was the patient seen in the last year in this department? Yes    Does the patient have an active prescription (recently filled or refills available) for medication(s) requested? No     PREVIOUS SCRIPT WAS NOT SENT WITH ICD-10 CODE. PLEASE RESEND WITH CORRECT DX TO [facility]

## 2022-01-17 ENCOUNTER — TELEPHONE (OUTPATIENT)
Dept: MEDICAL GROUP | Facility: CLINIC | Age: 57
End: 2022-01-17

## 2022-01-18 NOTE — TELEPHONE ENCOUNTER
Pt had scheduling issue today and was not able to make her appointment. I was notified she would like to speak to me regarding dental pain. Called and left message for call back.

## 2022-01-21 RX ORDER — CITALOPRAM 40 MG/1
TABLET ORAL
Qty: 30 TABLET | Refills: 0 | Status: SHIPPED | OUTPATIENT
Start: 2022-01-21 | End: 2022-02-27

## 2022-02-07 ENCOUNTER — TELEPHONE (OUTPATIENT)
Dept: SCHEDULING | Facility: IMAGING CENTER | Age: 57
End: 2022-02-07

## 2022-02-24 ENCOUNTER — OFFICE VISIT (OUTPATIENT)
Dept: MEDICAL GROUP | Facility: CLINIC | Age: 57
End: 2022-02-24
Payer: MEDICAID

## 2022-02-24 VITALS
WEIGHT: 177 LBS | OXYGEN SATURATION: 94 % | RESPIRATION RATE: 16 BRPM | TEMPERATURE: 99 F | SYSTOLIC BLOOD PRESSURE: 111 MMHG | HEART RATE: 107 BPM | BODY MASS INDEX: 27.78 KG/M2 | HEIGHT: 67 IN | DIASTOLIC BLOOD PRESSURE: 72 MMHG

## 2022-02-24 DIAGNOSIS — M25.559 HIP PAIN: ICD-10-CM

## 2022-02-24 DIAGNOSIS — K08.89 PAIN, DENTAL: ICD-10-CM

## 2022-02-24 PROCEDURE — 99213 OFFICE O/P EST LOW 20 MIN: CPT | Mod: GE | Performed by: STUDENT IN AN ORGANIZED HEALTH CARE EDUCATION/TRAINING PROGRAM

## 2022-02-24 RX ORDER — CYCLOBENZAPRINE HCL 5 MG
5-10 TABLET ORAL 3 TIMES DAILY PRN
Qty: 30 TABLET | Refills: 0 | Status: SHIPPED | OUTPATIENT
Start: 2022-02-24 | End: 2022-05-17 | Stop reason: SDUPTHER

## 2022-02-25 NOTE — PROGRESS NOTES
Subjective:     CC: dental pain    HPI:   Chantale presents today with chief concern of dental pain. Patient has been to oral surgeon and plan is for extraction of bottom teeth. She is requiring note stating medically cleared for this procedure. She also notes chronic hip pain. She notes that flexeril has helped in the past. She is wondering if injections could help.     Problem   Pain, Dental    Patient requiring medical clearance for oral surgeon to get teeth pulled. We will check an A1c and provide a letter for medical clearance.      Hip Pain    Bilateral hip pain. Wondering if hip could be injected.          Current Outpatient Medications Ordered in Epic   Medication Sig Dispense Refill   • cyclobenzaprine (FLEXERIL) 5 mg tablet Take 1-2 Tablets by mouth 3 times a day as needed for Mild Pain, Moderate Pain or Muscle Spasms. 30 Tablet 0   • citalopram (CELEXA) 40 MG Tab Take 1 tablet by mouth once daily 30 Tablet 0   • Dulaglutide (TRULICITY) 0.75 MG/0.5ML Solution Pen-injector Inject 0.5 mL under the skin every 7 days. 1.96 mL 1   • gabapentin (NEURONTIN) 400 MG Cap Take 1 Capsule by mouth 3 times a day. 90 Capsule 1   • insulin glargine (LANTUS) 100 UNIT/ML Solution Inject 20 Units under the skin at bedtime.     • lisinopril (PRINIVIL) 10 MG Tab Take 1 Tablet by mouth every day. 30 Tablet 1   • albuterol 108 (90 Base) MCG/ACT Aero Soln inhalation aerosol Inhale 2 Puffs every four hours as needed for Shortness of Breath. 1 Each    • simvastatin (ZOCOR) 20 MG Tab Take 1 Tablet by mouth every evening. 30 Tablet 11   • Insulin Pen Needle (PEN NEEDLES) 31G X 6 MM Misc 1 Package 3 times a day. 90 Each 3   • tizanidine (ZANAFLEX) 4 MG Tab Take 4 mg by mouth every 6 hours as needed.       No current Epic-ordered facility-administered medications on file.     ROS:  Gen: no fevers/chills, no changes in weight  Eyes: no changes in vision  ENT: no sore throat, no hearing loss, no bloody nose  Pulm: no sob, no cough  CV:  "no chest pain, no palpitations  GI: no nausea/vomiting, no diarrhea  : no dysuria  MSk: yes bilateral hip pain  Skin: no rash  Neuro: no headaches, no numbness/tingling  Heme/Lymph: no easy bruising      Objective:     Exam:  /72 (BP Location: Left arm, Patient Position: Sitting, BP Cuff Size: Adult)   Pulse (!) 107   Temp 37.2 °C (99 °F) (Tympanic)   Resp 16   Ht 1.702 m (5' 7\")   Wt 80.3 kg (177 lb)   SpO2 94%   BMI 27.72 kg/m²  Body mass index is 27.72 kg/m².    Gen: Alert and oriented, No apparent distress.  HEENT: poor dentition, missing multiple teeth, no abscess  Lungs: Normal effort, CTA bilaterally, no wheezes, rhonchi, or rales  CV: Regular rate and rhythm. No murmurs, rubs, or gallops.  Ext: No clubbing, cyanosis, edema.    Assessment & Plan:     56 y.o. female with the following -     Problem List Items Addressed This Visit     Pain, dental     Check A1c. Once this is completed will complete letter for medical clearance.          Relevant Orders    HEMOGLOBIN A1C    Hip pain     Short course of flexeril. Referral to sports medicine for potential hip injection.          Relevant Medications    cyclobenzaprine (FLEXERIL) 5 mg tablet    Other Relevant Orders    Referral to Sports Medicine            No follow-ups on file.    "

## 2022-02-27 RX ORDER — GABAPENTIN 400 MG/1
CAPSULE ORAL
Qty: 90 CAPSULE | Refills: 0 | Status: SHIPPED | OUTPATIENT
Start: 2022-02-27 | End: 2022-03-24

## 2022-02-27 RX ORDER — CITALOPRAM 40 MG/1
TABLET ORAL
Qty: 30 TABLET | Refills: 0 | Status: SHIPPED | OUTPATIENT
Start: 2022-02-27 | End: 2022-05-17 | Stop reason: SDUPTHER

## 2022-03-04 ENCOUNTER — TELEPHONE (OUTPATIENT)
Dept: MEDICAL GROUP | Facility: CLINIC | Age: 57
End: 2022-03-04
Payer: MEDICAID

## 2022-03-04 NOTE — TELEPHONE ENCOUNTER
Caller Name: Chantale Greer    Call Back Number: [phone] (home)       Patient called and still has an abcess in her tooth, she is wondering if you can send in some antibiotics for her.

## 2022-03-24 RX ORDER — GABAPENTIN 400 MG/1
CAPSULE ORAL
Qty: 90 CAPSULE | Refills: 0 | Status: SHIPPED | OUTPATIENT
Start: 2022-03-24 | End: 2022-05-17

## 2022-05-17 ENCOUNTER — OFFICE VISIT (OUTPATIENT)
Dept: MEDICAL GROUP | Facility: CLINIC | Age: 57
End: 2022-05-17
Payer: MEDICAID

## 2022-05-17 ENCOUNTER — APPOINTMENT (OUTPATIENT)
Dept: RADIOLOGY | Facility: CLINIC | Age: 57
End: 2022-05-17
Attending: STUDENT IN AN ORGANIZED HEALTH CARE EDUCATION/TRAINING PROGRAM
Payer: MEDICAID

## 2022-05-17 VITALS
BODY MASS INDEX: 28.56 KG/M2 | HEART RATE: 94 BPM | DIASTOLIC BLOOD PRESSURE: 89 MMHG | HEIGHT: 67 IN | RESPIRATION RATE: 16 BRPM | WEIGHT: 182 LBS | SYSTOLIC BLOOD PRESSURE: 126 MMHG | OXYGEN SATURATION: 98 %

## 2022-05-17 DIAGNOSIS — I10 PRIMARY HYPERTENSION: ICD-10-CM

## 2022-05-17 DIAGNOSIS — G89.29 CHRONIC BILATERAL LOW BACK PAIN WITH BILATERAL SCIATICA: ICD-10-CM

## 2022-05-17 DIAGNOSIS — E11.42 TYPE 2 DIABETES MELLITUS WITH DIABETIC POLYNEUROPATHY, WITHOUT LONG-TERM CURRENT USE OF INSULIN (HCC): ICD-10-CM

## 2022-05-17 DIAGNOSIS — M54.42 CHRONIC BILATERAL LOW BACK PAIN WITH BILATERAL SCIATICA: ICD-10-CM

## 2022-05-17 DIAGNOSIS — M79.642 PAIN IN BOTH HANDS: ICD-10-CM

## 2022-05-17 DIAGNOSIS — M54.41 CHRONIC BILATERAL LOW BACK PAIN WITH BILATERAL SCIATICA: ICD-10-CM

## 2022-05-17 DIAGNOSIS — F32.A DEPRESSION, UNSPECIFIED DEPRESSION TYPE: ICD-10-CM

## 2022-05-17 DIAGNOSIS — M79.641 PAIN IN BOTH HANDS: ICD-10-CM

## 2022-05-17 DIAGNOSIS — M25.559 HIP PAIN: ICD-10-CM

## 2022-05-17 PROBLEM — M54.9 BACK PAIN: Status: ACTIVE | Noted: 2022-05-17

## 2022-05-17 LAB
HBA1C MFR BLD: 9.6 % (ref 0–5.6)
INT CON NEG: ABNORMAL
INT CON POS: ABNORMAL

## 2022-05-17 PROCEDURE — 99214 OFFICE O/P EST MOD 30 MIN: CPT | Mod: GC | Performed by: STUDENT IN AN ORGANIZED HEALTH CARE EDUCATION/TRAINING PROGRAM

## 2022-05-17 PROCEDURE — 83036 HEMOGLOBIN GLYCOSYLATED A1C: CPT | Performed by: STUDENT IN AN ORGANIZED HEALTH CARE EDUCATION/TRAINING PROGRAM

## 2022-05-17 PROCEDURE — 77077 JOINT SURVEY SINGLE VIEW: CPT | Mod: TC,LT | Performed by: FAMILY MEDICINE

## 2022-05-17 RX ORDER — GABAPENTIN 600 MG/1
600 TABLET ORAL 3 TIMES DAILY
Qty: 270 TABLET | Refills: 3 | Status: SHIPPED | OUTPATIENT
Start: 2022-05-17 | End: 2022-09-08 | Stop reason: SDUPTHER

## 2022-05-17 RX ORDER — EMPAGLIFLOZIN 10 MG/1
10 TABLET, FILM COATED ORAL DAILY
Qty: 30 TABLET | Refills: 3 | Status: SHIPPED | OUTPATIENT
Start: 2022-05-17 | End: 2022-11-04

## 2022-05-17 RX ORDER — SIMVASTATIN 20 MG
20 TABLET ORAL EVERY EVENING
Qty: 90 TABLET | Refills: 3 | Status: SHIPPED | OUTPATIENT
Start: 2022-05-17 | End: 2022-09-08 | Stop reason: SDUPTHER

## 2022-05-17 RX ORDER — CYCLOBENZAPRINE HCL 5 MG
5-10 TABLET ORAL 3 TIMES DAILY PRN
Qty: 30 TABLET | Refills: 0 | Status: SHIPPED | OUTPATIENT
Start: 2022-05-17 | End: 2022-11-04 | Stop reason: SDUPTHER

## 2022-05-17 RX ORDER — CITALOPRAM 40 MG/1
40 TABLET ORAL DAILY
Qty: 90 TABLET | Refills: 3 | Status: SHIPPED | OUTPATIENT
Start: 2022-05-17 | End: 2022-09-08 | Stop reason: SDUPTHER

## 2022-05-17 RX ORDER — DULAGLUTIDE 1.5 MG/.5ML
0.5 INJECTION, SOLUTION SUBCUTANEOUS
Qty: 2.24 ML | Refills: 3 | Status: SHIPPED | OUTPATIENT
Start: 2022-05-17 | End: 2022-05-17

## 2022-05-17 RX ORDER — LISINOPRIL 10 MG/1
10 TABLET ORAL DAILY
Qty: 90 TABLET | Refills: 3 | Status: SHIPPED | OUTPATIENT
Start: 2022-05-17 | End: 2022-09-08 | Stop reason: SDUPTHER

## 2022-05-17 ASSESSMENT — PATIENT HEALTH QUESTIONNAIRE - PHQ9: CLINICAL INTERPRETATION OF PHQ2 SCORE: 0

## 2022-05-17 NOTE — LETTER
May 17, 2022      To whom it may concern,            Please accommodate Chantale Greer's emotional support cat Elier.                                Jaun Saldivar M.D.

## 2022-05-17 NOTE — LETTER
May 17, 2022    To whom it may concern,     Based on my evaluation today, 5/17/2022, Chantale Greer is cleared for dental surgery.                                    Jaun Saldivar M.D.

## 2022-05-17 NOTE — ASSESSMENT & PLAN NOTE
Chronic, deteriorated    56-year-old female with chronic low back pain and bilateral sciatica who requests refill on cyclobenzaprine.  She reports this did work significantly for her in the past.  I discussed ibuprofen for symptomatic relief as well.    Plan:  - Ibuprofen OTC as needed pain  - Cyclobenzaprine 10 mg p.o. daily as needed muscle spasms

## 2022-05-21 NOTE — PROGRESS NOTES
[facility]    Chief Complaint   Patient presents with   • Follow-Up     Med refill,referral to oral surgeon        HISTORY OF PRESENT ILLNESS: Patient is a 56 y.o. female established patient who presents today to discuss the medical issues below:    Problem   Type 2 Diabetes Mellitus With Diabetic Polyneuropathy, Without Long-Term Current Use of Insulin ([facility])    Patient with type 2 diabetes.  She is very noncompliant with her insulin.  Last visit I started Trulicity.  She has been compliant with Trulicity and denies any side effects.  She is not taking Lantus.  Not checking blood sugar at home.     Back Pain    Pt has hx of chronic back pain. Recently flared. Having bilateral psyatic symptoms.  No numbness, weakness, or incontinence.  Patient previously given cyclobenzaprine with good response.     Pain in Both Hands    Patient with partly arthralgia.  Would like work-up for possible psoriatic arthritis.  Her mother has psoriatic arthritis.          Patient Active Problem List    Diagnosis Date Noted   • Depression 05/17/2022   • Type 2 diabetes mellitus with diabetic polyneuropathy, without long-term current use of insulin ([facility]) 05/17/2022   • Back pain 05/17/2022   • HTN (hypertension) 05/17/2022   • Pain in both hands 05/17/2022   • Pain, dental 02/24/2022   • Hip pain 02/24/2022   • Type 2 DM with diabetic neuropathy affecting both sides of body ([facility]) 10/08/2021   • Dehydration 08/17/2015       Allergies:Patient has no allergy information on record.    Current Outpatient Medications   Medication Sig Dispense Refill   • gabapentin (NEURONTIN) 600 MG tablet Take 1 Tablet by mouth 3 times a day. 270 Tablet 3   • citalopram (CELEXA) 40 MG Tab Take 1 Tablet by mouth every day. 90 Tablet 3   • cyclobenzaprine (FLEXERIL) 5 mg tablet Take 1-2 Tablets by mouth 3 times a day as needed for Mild Pain, Moderate Pain or Muscle Spasms. 30 Tablet 0   • simvastatin (ZOCOR) 20 MG Tab Take 1 Tablet by mouth every evening.  "90 Tablet 3   • lisinopril (PRINIVIL) 10 MG Tab Take 1 Tablet by mouth every day. 90 Tablet 3   • Empagliflozin (JARDIANCE) 10 MG Tab Take 1 Tablet by mouth every day. 30 Tablet 3   • albuterol 108 (90 Base) MCG/ACT Aero Soln inhalation aerosol Inhale 2 Puffs every four hours as needed for Shortness of Breath. 1 Each    • tizanidine (ZANAFLEX) 4 MG Tab Take 4 mg by mouth every 6 hours as needed.     • TRULICITY 1.5 MG/0.5ML Solution Pen-injector INJECT 1.5 MG SUBCUTANEOUSLY  ONCE A WEEK 4 mL 3     No current facility-administered medications for this visit.         Past Medical History:   Diagnosis Date   • Diabetes (HCC)    • Diabetic neuropathy, painful (HCC)    • Hypertension        Social History     Tobacco Use   • Smoking status: Former Smoker     Packs/day: 0.25     Types: Cigarettes     Quit date:      Years since quittin.3   • Smokeless tobacco: Never Used   Substance Use Topics   • Alcohol use: Never   • Drug use: Never       No family status information on file.   No family history on file.    ROS:  Negative except as stated above.      Exam:    /89 (BP Location: Right arm, Patient Position: Sitting, BP Cuff Size: Adult)   Pulse 94   Resp 16   Ht 1.702 m (5' 7\")   Wt 82.6 kg (182 lb)   SpO2 98%  Body mass index is 28.51 kg/m².  General: Well-appearing and in no acute distress  HEENT: MMM, EOMI  Lungs: No respiratory distress or audible wheezing  Heart: Pulse present.  Abdomen: Nondistended.  Skin: No rashes or lesions visible  EXT: Warm and well-perfused  Neuro: Nonfocal    Assessment/Plan:    Back pain  Chronic, deteriorated    56-year-old female with chronic low back pain and bilateral sciatica who requests refill on cyclobenzaprine.  She reports this did work significantly for her in the past.  I discussed ibuprofen for symptomatic relief as well.    Plan:  - Ibuprofen OTC as needed pain  - Cyclobenzaprine 10 mg p.o. daily as needed muscle spasms    Type 2 diabetes mellitus with " diabetic polyneuropathy, without long-term current use of insulin (HCC)  Chronic, uncontrolled    56-year-old female with T2DM that is uncontrolled and A1c of 9.6 today in office.  She is on Trulicity and compliant.  She has actually had significant improvement in her A1c on Trulicity.  She is not taking Lantus.    Plan:  - Encourage patient to have her annual eye exam  - Increase Trulicity from 0.75 mg to 1.5 mg subcu weekly  - Repeat labs in 3 months.    Pain in both hands  56-year-old female with polyarthralgia and pain in both hands.  Discussed options.    Plan:  - X-ray today  - Follow-up ASAP to review results and discuss further work-up/treatment.

## 2022-05-21 NOTE — ASSESSMENT & PLAN NOTE
Chronic, uncontrolled    56-year-old female with T2DM that is uncontrolled and A1c of 9.6 today in office.  She is on Trulicity and compliant.  She has actually had significant improvement in her A1c on Trulicity.  She is not taking Lantus.    Plan:  - Encourage patient to have her annual eye exam  - Increase Trulicity from 0.75 mg to 1.5 mg subcu weekly  - Repeat labs in 3 months.

## 2022-05-21 NOTE — ASSESSMENT & PLAN NOTE
56-year-old female with polyarthralgia and pain in both hands.  Discussed options.    Plan:  - X-ray today  - Follow-up ASAP to review results and discuss further work-up/treatment.

## 2022-05-23 ENCOUNTER — TELEPHONE (OUTPATIENT)
Dept: MEDICAL GROUP | Facility: CLINIC | Age: 57
End: 2022-05-23
Payer: MEDICAID

## 2022-05-23 NOTE — TELEPHONE ENCOUNTER
Patient called and said her tooth is abscessing again, she is having her procedure in a few weeks. She is wondering If you can send in some antibiotics and maybe some ibuprofen. Thank you!     Pt w/ poor dentition and odontogenic infection. Will order 7 day course of Augmentin DS in hopes to not delay intervention by dentist.

## 2022-05-24 RX ORDER — AMOXICILLIN AND CLAVULANATE POTASSIUM 875; 125 MG/1; MG/1
1 TABLET, FILM COATED ORAL 2 TIMES DAILY
Qty: 14 TABLET | Refills: 0 | Status: SHIPPED | OUTPATIENT
Start: 2022-05-24 | End: 2022-12-28

## 2022-08-31 DIAGNOSIS — E11.42 TYPE 2 DIABETES MELLITUS WITH DIABETIC POLYNEUROPATHY, WITHOUT LONG-TERM CURRENT USE OF INSULIN (HCC): ICD-10-CM

## 2022-08-31 NOTE — TELEPHONE ENCOUNTER
Received request via: Pharmacy    Was the patient seen in the last year in this department? Yes    Does the patient have an active prescription (recently filled or refills available) for medication(s) requested? No      Could you fill this patients insulin please

## 2022-09-01 RX ORDER — DULAGLUTIDE 1.5 MG/.5ML
INJECTION, SOLUTION SUBCUTANEOUS
Qty: 4 ML | Refills: 3 | Status: SHIPPED | OUTPATIENT
Start: 2022-09-01 | End: 2022-11-04 | Stop reason: SDUPTHER

## 2022-09-02 ENCOUNTER — APPOINTMENT (OUTPATIENT)
Dept: SPORTS MEDICINE | Facility: CLINIC | Age: 57
End: 2022-09-02
Payer: MEDICAID

## 2022-09-08 RX ORDER — CITALOPRAM 40 MG/1
40 TABLET ORAL DAILY
Qty: 90 TABLET | Refills: 3 | Status: SHIPPED | OUTPATIENT
Start: 2022-09-08 | End: 2022-11-04 | Stop reason: SDUPTHER

## 2022-09-08 RX ORDER — SIMVASTATIN 20 MG
20 TABLET ORAL EVERY EVENING
Qty: 90 TABLET | Refills: 3 | Status: SHIPPED | OUTPATIENT
Start: 2022-09-08 | End: 2022-11-04 | Stop reason: SDUPTHER

## 2022-09-08 RX ORDER — LISINOPRIL 10 MG/1
10 TABLET ORAL DAILY
Qty: 90 TABLET | Refills: 3 | Status: SHIPPED | OUTPATIENT
Start: 2022-09-08 | End: 2022-11-04 | Stop reason: SDUPTHER

## 2022-09-08 RX ORDER — GABAPENTIN 600 MG/1
600 TABLET ORAL 3 TIMES DAILY
Qty: 270 TABLET | Refills: 3 | Status: SHIPPED | OUTPATIENT
Start: 2022-09-08 | End: 2022-11-04 | Stop reason: SDUPTHER

## 2022-09-08 NOTE — TELEPHONE ENCOUNTER
Received request via: Pharmacy    Was the patient seen in the last year in this department? Yes    Does the patient have an active prescription (recently filled or refills available) for medication(s) requested? No    Patient has an appointment coming up with you to re establish

## 2022-10-18 ENCOUNTER — OFFICE VISIT (OUTPATIENT)
Dept: MEDICAL GROUP | Facility: OTHER | Age: 57
End: 2022-10-18
Payer: MEDICAID

## 2022-10-18 ENCOUNTER — APPOINTMENT (OUTPATIENT)
Dept: RADIOLOGY | Facility: OTHER | Age: 57
End: 2022-10-18
Attending: STUDENT IN AN ORGANIZED HEALTH CARE EDUCATION/TRAINING PROGRAM
Payer: MEDICAID

## 2022-10-18 VITALS
TEMPERATURE: 96.6 F | DIASTOLIC BLOOD PRESSURE: 80 MMHG | SYSTOLIC BLOOD PRESSURE: 120 MMHG | HEART RATE: 87 BPM | OXYGEN SATURATION: 95 % | BODY MASS INDEX: 27.15 KG/M2 | WEIGHT: 173 LBS | HEIGHT: 67 IN | RESPIRATION RATE: 16 BRPM

## 2022-10-18 DIAGNOSIS — E11.42 TYPE 2 DIABETES MELLITUS WITH DIABETIC POLYNEUROPATHY, WITHOUT LONG-TERM CURRENT USE OF INSULIN (HCC): ICD-10-CM

## 2022-10-18 DIAGNOSIS — M54.42 CHRONIC BILATERAL LOW BACK PAIN WITH BILATERAL SCIATICA: ICD-10-CM

## 2022-10-18 DIAGNOSIS — M25.559 HIP PAIN: ICD-10-CM

## 2022-10-18 DIAGNOSIS — M70.62 TROCHANTERIC BURSITIS OF BOTH HIPS: ICD-10-CM

## 2022-10-18 DIAGNOSIS — Z23 NEED FOR VACCINATION: ICD-10-CM

## 2022-10-18 DIAGNOSIS — M70.61 TROCHANTERIC BURSITIS OF BOTH HIPS: ICD-10-CM

## 2022-10-18 DIAGNOSIS — M54.41 CHRONIC BILATERAL LOW BACK PAIN WITH BILATERAL SCIATICA: ICD-10-CM

## 2022-10-18 DIAGNOSIS — G89.29 CHRONIC BILATERAL LOW BACK PAIN WITH BILATERAL SCIATICA: ICD-10-CM

## 2022-10-18 DIAGNOSIS — M54.16 LUMBAR RADICULAR PAIN: ICD-10-CM

## 2022-10-18 LAB — GLUCOSE BLD-MCNC: 181 MG/DL (ref 70–100)

## 2022-10-18 PROCEDURE — 72100 X-RAY EXAM L-S SPINE 2/3 VWS: CPT | Mod: TC,FY | Performed by: FAMILY MEDICINE

## 2022-10-18 PROCEDURE — 90686 IIV4 VACC NO PRSV 0.5 ML IM: CPT | Performed by: STUDENT IN AN ORGANIZED HEALTH CARE EDUCATION/TRAINING PROGRAM

## 2022-10-18 PROCEDURE — 99214 OFFICE O/P EST MOD 30 MIN: CPT | Mod: 25,GC | Performed by: STUDENT IN AN ORGANIZED HEALTH CARE EDUCATION/TRAINING PROGRAM

## 2022-10-18 PROCEDURE — 82962 GLUCOSE BLOOD TEST: CPT | Performed by: STUDENT IN AN ORGANIZED HEALTH CARE EDUCATION/TRAINING PROGRAM

## 2022-10-18 PROCEDURE — 20610 DRAIN/INJ JOINT/BURSA W/O US: CPT | Performed by: STUDENT IN AN ORGANIZED HEALTH CARE EDUCATION/TRAINING PROGRAM

## 2022-10-18 PROCEDURE — 90471 IMMUNIZATION ADMIN: CPT | Performed by: STUDENT IN AN ORGANIZED HEALTH CARE EDUCATION/TRAINING PROGRAM

## 2022-10-18 RX ORDER — TRIAMCINOLONE ACETONIDE 40 MG/ML
40 INJECTION, SUSPENSION INTRA-ARTICULAR; INTRAMUSCULAR ONCE
Status: COMPLETED | OUTPATIENT
Start: 2022-10-18 | End: 2022-10-18

## 2022-10-18 RX ORDER — GABAPENTIN 300 MG/1
CAPSULE ORAL
COMMUNITY
Start: 2021-07-08 | End: 2022-10-18

## 2022-10-18 RX ORDER — LISINOPRIL 10 MG/1
TABLET ORAL
COMMUNITY
Start: 2021-08-05 | End: 2022-10-18

## 2022-10-18 RX ADMIN — TRIAMCINOLONE ACETONIDE 40 MG: 40 INJECTION, SUSPENSION INTRA-ARTICULAR; INTRAMUSCULAR at 13:23

## 2022-10-18 RX ADMIN — TRIAMCINOLONE ACETONIDE 40 MG: 40 INJECTION, SUSPENSION INTRA-ARTICULAR; INTRAMUSCULAR at 13:22

## 2022-10-19 NOTE — ASSESSMENT & PLAN NOTE
Xrays in office today with generalized arthritic changes but no gross alignment issues per my read. Also with disc height loss to left at L4-L5 level, likely source of intermittent radicular symptoms. Discussed with patient that physical therapy is first line for noninvasive treatment options and she agrees to try PT in advance of referral to pain management or surgery.

## 2022-10-19 NOTE — PROGRESS NOTES
Subjective:     CC: Back pain, hip pain    HPI:   Chantale presents today with the above Ccs.    Problem   Back Pain    Pt has hx of chronic back pain. Recently flared. Having intermittent bilateral psyatic symptoms.  No numbness, weakness, or incontinence.  Patient previously given cyclobenzaprine with transient response. Wonders whether she could have a steroid injection in her back today. Has never tried physical therapy. (-) incontinence, saddle anesthesia, weight loss, fevers, chills     Hip Pain    Bilateral hip pain. Localizes to lateral aspects of both hips. Wondering if hip could be injected.          Current Outpatient Medications Ordered in Epic   Medication Sig Dispense Refill    lisinopril (PRINIVIL) 10 MG Tab Take 1 Tablet by mouth every day. 90 Tablet 3    citalopram (CELEXA) 40 MG Tab Take 1 Tablet by mouth every day. 90 Tablet 3    gabapentin (NEURONTIN) 600 MG tablet Take 1 Tablet by mouth 3 times a day. 270 Tablet 3    simvastatin (ZOCOR) 20 MG Tab Take 1 Tablet by mouth every evening. 90 Tablet 3    Dulaglutide (TRULICITY) 1.5 MG/0.5ML Solution Pen-injector INJECT 1.5 MG SUBCUTANEOUSLY  ONCE A WEEK 4 mL 3    amoxicillin-clavulanate (AUGMENTIN) 875-125 MG Tab Take 1 Tablet by mouth 2 times a day. 14 Tablet 0    cyclobenzaprine (FLEXERIL) 5 mg tablet Take 1-2 Tablets by mouth 3 times a day as needed for Mild Pain, Moderate Pain or Muscle Spasms. 30 Tablet 0    Empagliflozin (JARDIANCE) 10 MG Tab Take 1 Tablet by mouth every day. 30 Tablet 3    albuterol 108 (90 Base) MCG/ACT Aero Soln inhalation aerosol Inhale 2 Puffs every four hours as needed for Shortness of Breath. 1 Each     tizanidine (ZANAFLEX) 4 MG Tab Take 4 mg by mouth every 6 hours as needed.       No current Epic-ordered facility-administered medications on file.       Health Maintenance: Deferred to PMD    ROS:  Gen: no fevers/chills, no changes in weight  Eyes: no changes in vision  ENT: no sore throat, no hearing loss, no  "rhinorrhea  Pulm: no sob, no cough  CV: no chest pain, no palpitations  GI: no nausea/vomiting, no diarrhea  MSk: see HPI  Skin: no rash  Neuro: see HPI  Heme/Lymph: no easy bruising      Objective:     Exam:  /80 (BP Location: Left arm, Patient Position: Sitting, BP Cuff Size: Adult)   Pulse 87   Temp 35.9 °C (96.6 °F) (Temporal)   Resp 16   Ht 1.702 m (5' 7\")   Wt 78.5 kg (173 lb)   SpO2 95%   BMI 27.10 kg/m²  Body mass index is 27.1 kg/m².    Gen: Alert and oriented, No apparent distress.  Neck: Neck is supple without lymphadenopathy.  Lungs: Normal effort, CTA bilaterally, no wheezes, rhonchi, or rales  CV: Regular rate and rhythm. No murmurs, rubs, or gallops.  MSK: No clubbing, cyanosis, edema. Back with TTP to lumbar paraspinal muscles b/l. ROM limited by generalized discomfort. No overlying skin changes. IR/ER of hips without pain, though IR limited to 25 degrees b/l. +TTP to greater trochanters b/l. Sensation to LEs intact b/l. Reflexes normal and symmetric.         Assessment & Plan:     56 y.o. female with the following -     Problem List Items Addressed This Visit       Hip pain     Greater trochanteric TTP. Pt does have IDDM; has not checked BG today. Therefore POC BG with 181. Discussed risks (inluding worsening hyperglycemia, severe, if not monitored and addressed; likewise increased risk of infection in context of DM), benefits and alternatives. Pt agrees to monitor BG appropriately and states she knows how to administer insulin appropriately based on climbing BG. Consent signed prior to procedure.    Pt cleansed in usual fashion. Using a 22 g 1.5\" needle, 40 mg kennalog in 4 mL 1% lidocaine was injected into the right greater trochanteric bursa. Bleeding was minimal, and pt tolerated the procedure well.     Pt cleansed in usual fashion. Using a 22 g 1.5\" needle, 40 mg kennalog in 4 mL 1% lidocaine was injected into the right greater trochanteric bursa. Bleeding was minimal, and pt " tolerated the procedure well.     Pt agrees to f/u in clinic in 4 weeks, sooner if concerns.         Type 2 diabetes mellitus with diabetic polyneuropathy, without long-term current use of insulin (HCC)    Relevant Orders    POCT Glucose (Completed)    Back pain     Xrays in office today with generalized arthritic changes but no gross alignment issues per my read. Also with disc height loss to left at L4-L5 level, likely source of intermittent radicular symptoms. Discussed with patient that physical therapy is first line for noninvasive treatment options and she agrees to try PT in advance of referral to pain management or surgery.           Other Visit Diagnoses       Need for vaccination        Relevant Orders    INFLUENZA VACCINE QUAD INJ (PF) (Completed)    POCT Glucose (Completed)    Lumbar radicular pain        Relevant Orders    DX-LUMBAR SPINE-2 OR 3 VIEWS (Completed)    POCT Glucose (Completed)    Trochanteric bursitis of both hips        Relevant Orders    POCT Glucose (Completed)                Return in about 4 weeks (around 11/15/2022).

## 2022-10-19 NOTE — ASSESSMENT & PLAN NOTE
"Greater trochanteric TTP. Pt does have IDDM; has not checked BG today. Therefore POC BG with 181. Discussed risks (inluding worsening hyperglycemia, severe, if not monitored and addressed; likewise increased risk of infection in context of DM), benefits and alternatives. Pt agrees to monitor BG appropriately and states she knows how to administer insulin appropriately based on climbing BG. Consent signed prior to procedure.    Pt cleansed in usual fashion. Using a 22 g 1.5\" needle, 40 mg kennalog in 4 mL 1% lidocaine was injected into the right greater trochanteric bursa. Bleeding was minimal, and pt tolerated the procedure well.     Pt cleansed in usual fashion. Using a 22 g 1.5\" needle, 40 mg kennalog in 4 mL 1% lidocaine was injected into the right greater trochanteric bursa. Bleeding was minimal, and pt tolerated the procedure well.     Pt agrees to f/u in clinic in 4 weeks, sooner if concerns.  "

## 2022-11-03 ENCOUNTER — APPOINTMENT (OUTPATIENT)
Dept: URGENT CARE | Facility: PHYSICIAN GROUP | Age: 57
End: 2022-11-03
Payer: MEDICAID

## 2022-11-04 ENCOUNTER — OFFICE VISIT (OUTPATIENT)
Dept: MEDICAL GROUP | Facility: CLINIC | Age: 57
End: 2022-11-04
Payer: MEDICAID

## 2022-11-04 VITALS
BODY MASS INDEX: 25.9 KG/M2 | TEMPERATURE: 97 F | OXYGEN SATURATION: 94 % | RESPIRATION RATE: 16 BRPM | SYSTOLIC BLOOD PRESSURE: 142 MMHG | DIASTOLIC BLOOD PRESSURE: 83 MMHG | HEIGHT: 67 IN | HEART RATE: 100 BPM | WEIGHT: 165 LBS

## 2022-11-04 DIAGNOSIS — Z12.11 SCREENING FOR COLON CANCER: ICD-10-CM

## 2022-11-04 DIAGNOSIS — R30.0 DYSURIA: ICD-10-CM

## 2022-11-04 DIAGNOSIS — J45.909 ASTHMA, UNSPECIFIED ASTHMA SEVERITY, UNSPECIFIED WHETHER COMPLICATED, UNSPECIFIED WHETHER PERSISTENT: ICD-10-CM

## 2022-11-04 DIAGNOSIS — G62.9 NEUROPATHY: ICD-10-CM

## 2022-11-04 DIAGNOSIS — M25.559 HIP PAIN: ICD-10-CM

## 2022-11-04 DIAGNOSIS — Z84.0 FAMILY HISTORY OF PSORIATIC ARTHRITIS: ICD-10-CM

## 2022-11-04 DIAGNOSIS — R11.0 NAUSEA: ICD-10-CM

## 2022-11-04 DIAGNOSIS — F32.A DEPRESSION, UNSPECIFIED DEPRESSION TYPE: ICD-10-CM

## 2022-11-04 DIAGNOSIS — I10 PRIMARY HYPERTENSION: ICD-10-CM

## 2022-11-04 DIAGNOSIS — E11.42 TYPE 2 DIABETES MELLITUS WITH DIABETIC POLYNEUROPATHY, WITHOUT LONG-TERM CURRENT USE OF INSULIN (HCC): ICD-10-CM

## 2022-11-04 PROCEDURE — 99213 OFFICE O/P EST LOW 20 MIN: CPT | Mod: GE | Performed by: STUDENT IN AN ORGANIZED HEALTH CARE EDUCATION/TRAINING PROGRAM

## 2022-11-04 RX ORDER — CITALOPRAM 40 MG/1
40 TABLET ORAL DAILY
Qty: 90 TABLET | Refills: 3 | Status: SHIPPED | OUTPATIENT
Start: 2022-11-04 | End: 2023-09-28 | Stop reason: SDUPTHER

## 2022-11-04 RX ORDER — LISINOPRIL 10 MG/1
10 TABLET ORAL DAILY
Qty: 90 TABLET | Refills: 3 | Status: SHIPPED | OUTPATIENT
Start: 2022-11-04 | End: 2023-01-17 | Stop reason: SDUPTHER

## 2022-11-04 RX ORDER — DULAGLUTIDE 1.5 MG/.5ML
INJECTION, SOLUTION SUBCUTANEOUS
Qty: 4 ML | Refills: 3 | Status: SHIPPED | OUTPATIENT
Start: 2022-11-04

## 2022-11-04 RX ORDER — CYCLOBENZAPRINE HCL 5 MG
5-10 TABLET ORAL 3 TIMES DAILY PRN
Qty: 30 TABLET | Refills: 0 | Status: SHIPPED | OUTPATIENT
Start: 2022-11-04 | End: 2022-12-28

## 2022-11-04 RX ORDER — SIMVASTATIN 20 MG
20 TABLET ORAL EVERY EVENING
Qty: 90 TABLET | Refills: 3 | Status: SHIPPED | OUTPATIENT
Start: 2022-11-04

## 2022-11-04 RX ORDER — GABAPENTIN 600 MG/1
600 TABLET ORAL 3 TIMES DAILY
Qty: 270 TABLET | Refills: 3 | Status: SHIPPED | OUTPATIENT
Start: 2022-11-04 | End: 2023-09-28 | Stop reason: SDUPTHER

## 2022-11-04 RX ORDER — ALBUTEROL SULFATE 90 UG/1
2 AEROSOL, METERED RESPIRATORY (INHALATION) EVERY 4 HOURS PRN
Qty: 1 EACH | Refills: 2 | Status: SHIPPED | OUTPATIENT
Start: 2022-11-04 | End: 2023-08-18 | Stop reason: SDUPTHER

## 2022-11-04 RX ORDER — ONDANSETRON 4 MG/1
4 TABLET, ORALLY DISINTEGRATING ORAL EVERY 6 HOURS PRN
Qty: 10 TABLET | Refills: 0 | Status: SHIPPED | OUTPATIENT
Start: 2022-11-04 | End: 2022-12-28

## 2022-11-04 NOTE — PROGRESS NOTES
Subjective:     CC: multiple concerns    HPI:   Chantale presents today with multiple concerns    Wanting to chiefly discuss diabetes and hypertension.     For T2D taking dulaglutide. Has tried empaliflozin but it made her sick. Unclear if she has taken metformin. She has taken insulin in the past. She is not checking her sugars. She has neuropathy in her hands and feet. She has not seen an eye doctor in a long time.     She is taking lisinopril 10 mg daily for HTN. She is taking simvastatin. She is not checking her blood pressure at home.     She is taking gabapentin for her neuropathy and finds it helpful.     She is taking Augmentin for strep infection. It is making her nauseated. Requesting ondansetron.     She is taking citalopram 40 mg daily for depression. It is helpful.     She utilizes albuterol for occasional wheezing.     Problem   Nausea   Dysuria   Screening for Colon Cancer   Asthma   Family History of Psoriatic Arthritis   Neuropathy       Current Outpatient Medications Ordered in Epic   Medication Sig Dispense Refill    ondansetron (ZOFRAN ODT) 4 MG TABLET DISPERSIBLE Take 1 Tablet by mouth every 6 hours as needed for Nausea. 10 Tablet 0    albuterol 108 (90 Base) MCG/ACT Aero Soln inhalation aerosol Inhale 2 Puffs every four hours as needed for Shortness of Breath. 1 Each 2    citalopram (CELEXA) 40 MG Tab Take 1 Tablet by mouth every day. 90 Tablet 3    cyclobenzaprine (FLEXERIL) 5 mg tablet Take 1-2 Tablets by mouth 3 times a day as needed for Mild Pain, Moderate Pain or Muscle Spasms. 30 Tablet 0    Dulaglutide (TRULICITY) 1.5 MG/0.5ML Solution Pen-injector INJECT 1.5 MG SUBCUTANEOUSLY  ONCE A WEEK 4 mL 3    gabapentin (NEURONTIN) 600 MG tablet Take 1 Tablet by mouth 3 times a day. 270 Tablet 3    lisinopril (PRINIVIL) 10 MG Tab Take 1 Tablet by mouth every day. 90 Tablet 3    simvastatin (ZOCOR) 20 MG Tab Take 1 Tablet by mouth every evening. 90 Tablet 3    amoxicillin-clavulanate (AUGMENTIN)  "875-125 MG Tab Take 1 Tablet by mouth 2 times a day. (Patient not taking: Reported on 11/4/2022) 14 Tablet 0    Empagliflozin (JARDIANCE) 10 MG Tab Take 1 Tablet by mouth every day. (Patient not taking: Reported on 11/4/2022) 30 Tablet 3    tizanidine (ZANAFLEX) 4 MG Tab Take 4 mg by mouth every 6 hours as needed. (Patient not taking: Reported on 11/4/2022)       No current [facility]-ordered facility-administered medications on file.     ROS:  Gen: no fevers/chills  Eyes: no changes in vision  Pulm: no sob  CV: no chest pain  GI: no nausea/vomiting  : no dysuria  MSk: no myalgias  Skin: no rash      Objective:     Exam:  BP (!) 142/83 (BP Location: Right arm, Patient Position: Sitting, BP Cuff Size: Adult)   Pulse 100   Temp 36.1 °C (97 °F) (Temporal)   Resp 16   Ht 1.702 m (5' 7\")   Wt 74.8 kg (165 lb)   SpO2 94%   BMI 25.84 kg/m²  Body mass index is 25.84 kg/m².    Gen: Alert and oriented, No apparent distress.  Neck: Neck is supple without lymphadenopathy.  Lungs: Normal effort, CTA bilaterally, no wheezes  CV: Regular rate and rhythm. No murmurs  Ext: Failed monofilament test in both feet, weak pulses in both feet, no skin breakdown in either foot    Assessment & Plan:     57 y.o. female with the following -     Problem List Items Addressed This Visit       Hip pain    Relevant Medications    cyclobenzaprine (FLEXERIL) 5 mg tablet    Depression    Relevant Medications    citalopram (CELEXA) 40 MG Tab    Type 2 diabetes mellitus with diabetic polyneuropathy, without long-term current use of insulin (HCC)    Relevant Medications    citalopram (CELEXA) 40 MG Tab    cyclobenzaprine (FLEXERIL) 5 mg tablet    Dulaglutide (TRULICITY) 1.5 MG/0.5ML Solution Pen-injector    gabapentin (NEURONTIN) 600 MG tablet    simvastatin (ZOCOR) 20 MG Tab    Other Relevant Orders    HEMOGLOBIN A1C    Comp Metabolic Panel    MICROALBUMIN CREAT RATIO URINE    Referral to Optometry    URINALYSIS,CULTURE IF INDICATED    Comp Metabolic " Panel    HEMOGLOBIN A1C    MICROALBUMIN CREAT RATIO URINE    HTN (hypertension)    Relevant Medications    lisinopril (PRINIVIL) 10 MG Tab    simvastatin (ZOCOR) 20 MG Tab    Other Relevant Orders    Comp Metabolic Panel    Comp Metabolic Panel    Nausea    Relevant Medications    ondansetron (ZOFRAN ODT) 4 MG TABLET DISPERSIBLE    Dysuria    Relevant Orders    URINALYSIS,CULTURE IF INDICATED    URINALYSIS,CULTURE IF INDICATED    Screening for colon cancer    Relevant Orders    Referral to GI for Colonoscopy    Asthma    Relevant Medications    albuterol 108 (90 Base) MCG/ACT Aero Soln inhalation aerosol    Family history of psoriatic arthritis    Neuropathy    Relevant Medications    citalopram (CELEXA) 40 MG Tab    cyclobenzaprine (FLEXERIL) 5 mg tablet    gabapentin (NEURONTIN) 600 MG tablet     T2D with associated neuropathy. Failed monofilament test and has weak pulses bilaterally. Stressed importance of diabetic foot care. Currently taking dulaglutide 1.5 mg/ 0.5 mL. Did not tolerate SGLT-2. Has taken insulin in the past. Unclear if has ever taken metformin. Check A1c, urine microalbumin creatine ratio. Referral for Optometry. Check sugar twice per day. Gabapentin for neuropathy. Follow-up 6 weeks.     HTN. Taking lisinopril 10 mg daily. BP elevated at 142/83 today. A few weeks ago in clinic blood pressure was normal. Recommend checking at home. Check CMP. Continue simvastatin medication.     Currently being treated for strep throat from Urgent Care with Augmentin. Getting nauseated and ondansetron will be prescribed.     History of asthma. Albuterol refilled.    History of depression. Citalopram refilled.     Has never had colonoscopy. Screening colonoscopy ordered today.     Has not had pap smear for about 6 or 7 years. Will schedule for one.     Mother with history of psoriatic arthritis and wondering if patient has it as well. We will discuss further at follow-up visit.     Follow-up to go over diabetes  medications and for pap smear.

## 2022-11-15 ENCOUNTER — OFFICE VISIT (OUTPATIENT)
Dept: MEDICAL GROUP | Facility: OTHER | Age: 57
End: 2022-11-15
Payer: MEDICAID

## 2022-11-15 VITALS — BODY MASS INDEX: 25.58 KG/M2 | RESPIRATION RATE: 16 BRPM | HEIGHT: 67 IN | WEIGHT: 163 LBS

## 2022-11-15 DIAGNOSIS — J02.9 SORE THROAT: ICD-10-CM

## 2022-11-15 DIAGNOSIS — J02.9 ACUTE PHARYNGITIS, UNSPECIFIED ETIOLOGY: ICD-10-CM

## 2022-11-15 DIAGNOSIS — M25.559 HIP PAIN: ICD-10-CM

## 2022-11-15 LAB
FLUAV+FLUBV AG SPEC QL IA: NEGATIVE
INT CON NEG: NORMAL
INT CON POS: NORMAL

## 2022-11-15 PROCEDURE — 87804 INFLUENZA ASSAY W/OPTIC: CPT | Performed by: FAMILY MEDICINE

## 2022-11-15 PROCEDURE — 99213 OFFICE O/P EST LOW 20 MIN: CPT | Performed by: FAMILY MEDICINE

## 2022-11-15 RX ORDER — AMOXICILLIN AND CLAVULANATE POTASSIUM 875; 125 MG/1; MG/1
1 TABLET, FILM COATED ORAL 2 TIMES DAILY
Qty: 20 TABLET | Refills: 0 | Status: SHIPPED | OUTPATIENT
Start: 2022-11-15 | End: 2022-11-25

## 2022-11-15 NOTE — PROGRESS NOTES
Subjective:   CC:   Chief Complaint   Patient presents with    Follow-Up     Pt is to follow up on her bilateral hip injections, she did ok for a couple of weeks but right hip is painful again.        HPI: Chantale is a 57 y.o. female who presents today for the following problems:    Problem   Acute Pharyngitis    Chantale also comes in today with a complaint of an upper respiratory infection which is led to a sore throat.  She states that her sinuses are congested and she has a lot of drainage.  In addition her throat has become sore.  She states that she has had this for almost 14 to 18 days now and then she is tired of it.  She says at first she had some fever and chills and felt bad got better and now feels worse again.  No rashes.  States that she has a dry cough.  Worse in the mornings.     Hip Pain    Chantale comes in to see me today for couple of problems.  The first is follow-up on her hip pain.  We gave her a trochanteric bursitis injections and she did well for about a month.  States that the pain was almost gone.  Now she states that her right side is starting to come back but is now currently not at the level it was when she first came in to see me.  She states the pain is the same and directly over the trochanteric bursa area.  She denies any trauma no radiation of pain and describes the pain is sharp and associated with movement.         Current Outpatient Medications   Medication Sig Dispense Refill    amoxicillin-clavulanate (AUGMENTIN) 875-125 MG Tab Take 1 Tablet by mouth 2 times a day for 10 days. 20 Tablet 0    ondansetron (ZOFRAN ODT) 4 MG TABLET DISPERSIBLE Take 1 Tablet by mouth every 6 hours as needed for Nausea. 10 Tablet 0    albuterol 108 (90 Base) MCG/ACT Aero Soln inhalation aerosol Inhale 2 Puffs every four hours as needed for Shortness of Breath. 1 Each 2    citalopram (CELEXA) 40 MG Tab Take 1 Tablet by mouth every day. 90 Tablet 3    cyclobenzaprine (FLEXERIL) 5 mg tablet Take 1-2  "Tablets by mouth 3 times a day as needed for Mild Pain, Moderate Pain or Muscle Spasms. 30 Tablet 0    Dulaglutide (TRULICITY) 1.5 MG/0.5ML Solution Pen-injector INJECT 1.5 MG SUBCUTANEOUSLY  ONCE A WEEK 4 mL 3    gabapentin (NEURONTIN) 600 MG tablet Take 1 Tablet by mouth 3 times a day. 270 Tablet 3    lisinopril (PRINIVIL) 10 MG Tab Take 1 Tablet by mouth every day. 90 Tablet 3    simvastatin (ZOCOR) 20 MG Tab Take 1 Tablet by mouth every evening. 90 Tablet 3    amoxicillin-clavulanate (AUGMENTIN) 875-125 MG Tab Take 1 Tablet by mouth 2 times a day. (Patient not taking: Reported on 2022) 14 Tablet 0     No current facility-administered medications for this visit.       Social History     Tobacco Use    Smoking status: Former     Packs/day: 0.25     Types: Cigarettes     Quit date:      Years since quittin.8    Smokeless tobacco: Never   Vaping Use    Vaping Use: Former    Substances: Nicotine    Devices: Disposable   Substance Use Topics    Alcohol use: Never    Drug use: Never       Review of Systems   Constitutional: Negative.    HENT: Negative.     Eyes: Negative.    Respiratory: Negative.     Cardiovascular: Negative.    Gastrointestinal: Negative.    Skin: Negative.    Neurological: Negative.    Psychiatric/Behavioral: Negative.         Objective:     Vitals:    11/15/22 1102   BP: (P) 138/84   BP Location: (P) Right arm   Patient Position: (P) Sitting   BP Cuff Size: (P) Adult   Pulse: (!) (P) 104   Resp: 16   Temp: (P) 36.4 °C (97.6 °F)   TempSrc: (P) Temporal   SpO2: (P) 98%   Weight: 73.9 kg (163 lb)   Height: 1.702 m (5' 7\")     Body mass index is 25.53 kg/m².     Physical Exam  HENT:      Head: Normocephalic and atraumatic.      Comments: Positive tenderness to palpation to the maxillary and frontal sinuses on right greater than left     Right Ear: Tympanic membrane normal.      Left Ear: Tympanic membrane normal.      Nose: Nose normal.      Mouth/Throat:      Comments: Postnasal drip " appreciated  Eyes:      Extraocular Movements: Extraocular movements intact.      Conjunctiva/sclera: Conjunctivae normal.      Pupils: Pupils are equal, round, and reactive to light.   Cardiovascular:      Rate and Rhythm: Normal rate and regular rhythm.      Pulses: Normal pulses.      Heart sounds: Normal heart sounds. No murmur heard.    No friction rub. No gallop.   Pulmonary:      Effort: Pulmonary effort is normal. No respiratory distress.      Breath sounds: Normal breath sounds. No stridor. No wheezing, rhonchi or rales.   Musculoskeletal:      Cervical back: Normal range of motion and neck supple.   Neurological:      Mental Status: She is alert.        Assessment & Plan:   Hip pain  This is trochanteric bursitis bilaterally which is probably a consequence of weak hip stabilizer muscles.  Someone to refer to her physical therapist for some extra attention to her hip stabilization muscles.  And I will follow her up in about 6 weeks.    Acute pharyngitis  I feel that this is sinusitis we will get a go and give her some Augmentin I will follow her up in about 2 weeks if she is not any better.  Sooner as needed      Followup: Return in about 2 months (around 1/15/2023), or if symptoms worsen or fail to improve.    Celso Bass M.D.    Please note that this dictation was created using voice recognition software. I have made every reasonable attempt to correct obvious errors, but I expect that there are errors of grammar and possibly content that I did not discover before finalizing the note.

## 2022-11-18 PROBLEM — J02.9 ACUTE PHARYNGITIS: Status: ACTIVE | Noted: 2022-11-18

## 2022-11-18 ASSESSMENT — ENCOUNTER SYMPTOMS
CARDIOVASCULAR NEGATIVE: 1
CONSTITUTIONAL NEGATIVE: 1
GASTROINTESTINAL NEGATIVE: 1
EYES NEGATIVE: 1
NEUROLOGICAL NEGATIVE: 1
RESPIRATORY NEGATIVE: 1
PSYCHIATRIC NEGATIVE: 1

## 2022-11-18 NOTE — ASSESSMENT & PLAN NOTE
I feel that this is sinusitis we will get a go and give her some Augmentin I will follow her up in about 2 weeks if she is not any better.  Sooner as needed

## 2022-11-18 NOTE — ASSESSMENT & PLAN NOTE
This is trochanteric bursitis bilaterally which is probably a consequence of weak hip stabilizer muscles.  Someone to refer to her physical therapist for some extra attention to her hip stabilization muscles.  And I will follow her up in about 6 weeks.

## 2022-12-23 ENCOUNTER — APPOINTMENT (OUTPATIENT)
Dept: MEDICAL GROUP | Facility: CLINIC | Age: 57
End: 2022-12-23
Payer: MEDICAID

## 2022-12-28 ENCOUNTER — OFFICE VISIT (OUTPATIENT)
Dept: MEDICAL GROUP | Facility: OTHER | Age: 57
End: 2022-12-28
Payer: MEDICAID

## 2022-12-28 VITALS
DIASTOLIC BLOOD PRESSURE: 70 MMHG | HEART RATE: 106 BPM | BODY MASS INDEX: 25.9 KG/M2 | HEIGHT: 67 IN | WEIGHT: 165 LBS | SYSTOLIC BLOOD PRESSURE: 118 MMHG | OXYGEN SATURATION: 97 % | TEMPERATURE: 98.4 F

## 2022-12-28 DIAGNOSIS — M54.42 CHRONIC BILATERAL LOW BACK PAIN WITH BILATERAL SCIATICA: ICD-10-CM

## 2022-12-28 DIAGNOSIS — M54.41 CHRONIC BILATERAL LOW BACK PAIN WITH BILATERAL SCIATICA: ICD-10-CM

## 2022-12-28 DIAGNOSIS — M25.559 HIP PAIN: ICD-10-CM

## 2022-12-28 DIAGNOSIS — G89.29 CHRONIC BILATERAL LOW BACK PAIN WITH BILATERAL SCIATICA: ICD-10-CM

## 2022-12-28 PROCEDURE — 99213 OFFICE O/P EST LOW 20 MIN: CPT | Mod: GC,25 | Performed by: STUDENT IN AN ORGANIZED HEALTH CARE EDUCATION/TRAINING PROGRAM

## 2022-12-28 PROCEDURE — 20610 DRAIN/INJ JOINT/BURSA W/O US: CPT | Performed by: STUDENT IN AN ORGANIZED HEALTH CARE EDUCATION/TRAINING PROGRAM

## 2022-12-28 RX ORDER — TRIAMCINOLONE ACETONIDE 40 MG/ML
40 INJECTION, SUSPENSION INTRA-ARTICULAR; INTRAMUSCULAR ONCE
Status: COMPLETED | OUTPATIENT
Start: 2022-12-28 | End: 2022-12-28

## 2022-12-28 RX ORDER — AZITHROMYCIN 250 MG/1
TABLET, FILM COATED ORAL
COMMUNITY
Start: 2022-11-03 | End: 2022-12-28

## 2022-12-28 RX ORDER — FLUCONAZOLE 150 MG/1
TABLET ORAL
COMMUNITY
Start: 2022-10-26 | End: 2022-12-28

## 2022-12-28 RX ADMIN — TRIAMCINOLONE ACETONIDE 40 MG: 40 INJECTION, SUSPENSION INTRA-ARTICULAR; INTRAMUSCULAR at 12:55

## 2022-12-28 RX ADMIN — TRIAMCINOLONE ACETONIDE 40 MG: 40 INJECTION, SUSPENSION INTRA-ARTICULAR; INTRAMUSCULAR at 12:54

## 2022-12-28 NOTE — PROGRESS NOTES
"Subjective:     CC: Hip pain on both sides    HPI:   Chantale presents today with the above chief complaint.    Problem   Hip Pain    Chantale returns today for bilateral greater trochanteric pain.  We gave her a trochanteric bursitis injections and she did well for about a month; she returned to clinic about 6 weeks ago hoping for more injections but was educated that she can only have them about every 3 months, ideally only 3 times a year at most.      She states the pain is the same as before, and is located directly over the trochanteric bursa area.  She denies any trauma no radiation of pain and describes the pain is sharp and associated with movement.    She has been referred to physical therapy previously but has not made contact with any physical therapist to initiate care.         Current Outpatient Medications Ordered in Epic   Medication Sig Dispense Refill    albuterol 108 (90 Base) MCG/ACT Aero Soln inhalation aerosol Inhale 2 Puffs every four hours as needed for Shortness of Breath. 1 Each 2    citalopram (CELEXA) 40 MG Tab Take 1 Tablet by mouth every day. 90 Tablet 3    Dulaglutide (TRULICITY) 1.5 MG/0.5ML Solution Pen-injector INJECT 1.5 MG SUBCUTANEOUSLY  ONCE A WEEK 4 mL 3    gabapentin (NEURONTIN) 600 MG tablet Take 1 Tablet by mouth 3 times a day. 270 Tablet 3    lisinopril (PRINIVIL) 10 MG Tab Take 1 Tablet by mouth every day. 90 Tablet 3    simvastatin (ZOCOR) 20 MG Tab Take 1 Tablet by mouth every evening. 90 Tablet 3     No current Epic-ordered facility-administered medications on file.       Health Maintenance: Deferred to primary care physician          Objective:     Exam:  /70 (BP Location: Left arm, Patient Position: Sitting)   Pulse (!) 106   Temp 36.9 °C (98.4 °F) (Temporal)   Ht 1.702 m (5' 7\")   Wt 74.8 kg (165 lb)   SpO2 97%   BMI 25.84 kg/m²  Body mass index is 25.84 kg/m².    Gen: Alert and oriented, No apparent distress.  Neck: Neck is supple without " lymphadenopathy.  Lungs: Normal effort, CTA bilaterally, no wheezes, rhonchi, or rales  CV: Regular rate and rhythm. No murmurs, rubs, or gallops.  Ext: No clubbing, cyanosis, edema.  Mildly antalgic gait but otherwise normal gait.  There is exquisite tenderness palpation over the greater trochanters bilaterally.  Sensation is grossly normal over the lower extremities.      Assessment & Plan:     57 y.o. 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  Emphasized importance of establishing with a physical therapist to address this issue so that she can be pain-free without injections or other medications.  New referral to physical therapy sent again today.    After discussing risks (including infection, bleeding, permanent skin changes), the patient wished to proceed and consent was signed.  After cleansing the area x3 with ChloraPrep, a steroid injection was performed at the right greater trochanter using 4 cc 2 % plain Lidocaine and 40 mg of kennalog  through a 22 gauge needle. This was well tolerated. Will RTC if any concerns.       After discussing risks (including infection, bleeding, permanent skin changes), the patient wished to proceed and consent was signed.  After cleansing the area x3 with ChloraPrep, a steroid injection was performed at the left greater trochanter using 4 cc 2 % plain Lidocaine and 40 mg of kennalog  through a 22 gauge needle. This was well tolerated. Will RTC if any concerns.     Dr Bass, attending physician, was present for the entirety of the procedures.            Relevant Orders    Referral to Physical Therapy    Back pain    Relevant Orders    Referral to Physical Therapy           Return if symptoms worsen or fail to improve.

## 2022-12-28 NOTE — ASSESSMENT & PLAN NOTE
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  Emphasized importance of establishing with a physical therapist to address this issue so that she can be pain-free without injections or other medications.  New referral to physical therapy sent again today.    After discussing risks (including infection, bleeding, permanent skin changes), the patient wished to proceed and consent was signed.  After cleansing the area x3 with ChloraPrep, a steroid injection was performed at the right greater trochanter using 4 cc 2 % plain Lidocaine and 40 mg of kennalog  through a 22 gauge needle. This was well tolerated. Will RTC if any concerns.       After discussing risks (including infection, bleeding, permanent skin changes), the patient wished to proceed and consent was signed.  After cleansing the area x3 with ChloraPrep, a steroid injection was performed at the left greater trochanter using 4 cc 2 % plain Lidocaine and 40 mg of kennalog  through a 22 gauge needle. This was well tolerated. Will RTC if any concerns.     Dr Bass, attending physician, was present for the entirety of the procedures.

## 2023-01-08 NOTE — TELEPHONE ENCOUNTER
Received page concerning needing insulin refill. After chart review, no active Rx for insulin noted. DM2 mamaged with Trulicity. Attempted to call patient x 2 for clarification with no answer. Will send [employer] Message to have return call.

## 2023-01-17 ENCOUNTER — OFFICE VISIT (OUTPATIENT)
Dept: MEDICAL GROUP | Facility: CLINIC | Age: 58
End: 2023-01-17
Payer: MEDICAID

## 2023-01-17 VITALS
OXYGEN SATURATION: 98 % | DIASTOLIC BLOOD PRESSURE: 76 MMHG | HEIGHT: 67 IN | TEMPERATURE: 97.9 F | RESPIRATION RATE: 17 BRPM | WEIGHT: 170 LBS | BODY MASS INDEX: 26.68 KG/M2 | HEART RATE: 95 BPM | SYSTOLIC BLOOD PRESSURE: 113 MMHG

## 2023-01-17 DIAGNOSIS — E11.42 TYPE 2 DM WITH DIABETIC NEUROPATHY AFFECTING BOTH SIDES OF BODY (HCC): ICD-10-CM

## 2023-01-17 DIAGNOSIS — M25.559 HIP PAIN: ICD-10-CM

## 2023-01-17 DIAGNOSIS — N87.0 CIN I (CERVICAL INTRAEPITHELIAL NEOPLASIA I): ICD-10-CM

## 2023-01-17 DIAGNOSIS — J34.89 LESION OF NOSE: ICD-10-CM

## 2023-01-17 DIAGNOSIS — I10 PRIMARY HYPERTENSION: ICD-10-CM

## 2023-01-17 LAB
HBA1C MFR BLD: 11.7 % (ref 0–5.6)
INT CON NEG: ABNORMAL
INT CON POS: ABNORMAL

## 2023-01-17 PROCEDURE — 99213 OFFICE O/P EST LOW 20 MIN: CPT | Mod: GE | Performed by: STUDENT IN AN ORGANIZED HEALTH CARE EDUCATION/TRAINING PROGRAM

## 2023-01-17 PROCEDURE — 83036 HEMOGLOBIN GLYCOSYLATED A1C: CPT | Mod: GC | Performed by: STUDENT IN AN ORGANIZED HEALTH CARE EDUCATION/TRAINING PROGRAM

## 2023-01-17 RX ORDER — LISINOPRIL 10 MG/1
10 TABLET ORAL DAILY
Qty: 90 TABLET | Refills: 3 | Status: SHIPPED | OUTPATIENT
Start: 2023-01-17 | End: 2023-09-28 | Stop reason: SDUPTHER

## 2023-01-17 RX ORDER — CYCLOBENZAPRINE HCL 5 MG
5 TABLET ORAL 3 TIMES DAILY PRN
Qty: 30 TABLET | Refills: 0 | Status: SHIPPED | OUTPATIENT
Start: 2023-01-17 | End: 2023-03-13 | Stop reason: SDUPTHER

## 2023-01-17 RX ORDER — DULAGLUTIDE 3 MG/.5ML
3 INJECTION, SOLUTION SUBCUTANEOUS
Qty: 0.5 ML | Refills: 3 | Status: SHIPPED | OUTPATIENT
Start: 2023-01-17 | End: 2023-08-17 | Stop reason: SDUPTHER

## 2023-01-17 ASSESSMENT — PATIENT HEALTH QUESTIONNAIRE - PHQ9: CLINICAL INTERPRETATION OF PHQ2 SCORE: 0

## 2023-01-17 NOTE — PROGRESS NOTES
"  HPI:  Patient is a 57 y.o. female. This pleasant patient is here today   For PAP  Nose lesions, and on buttock  A1C checked >10%- has been out of Trulicity for 4 weeks. Has been tolerating 1.5, needs to be increased, there have been periods when she ran out and restarted without issue. Denies GI effects.  - agrees with 1.5 to 3.0 Trulicity - will let me know after this week injection if side effects and will decrease back to 1.5 for a few weeks before up titrating, likely needs up to 4.5mg.    LGSIL 2017  CIN1 in 3/2018  -lost to follow up. Not sexually active.   Declines STI testing. NO vaginal complaints.      PAP today, RERE Hayward present for exam.      Unhealing aspect of tip of nose, bumps on inner surface of nose where follicles present.   Usually pops, \"until something comes out\"         No problems updated.                                                                                                                               Patient Active Problem List    Diagnosis Date Noted    Acute pharyngitis 2022    Nausea 2022    Dysuria 2022    Screening for colon cancer 2022    Asthma 2022    Family history of psoriatic arthritis 2022    Neuropathy 2022    Depression 2022    Type 2 diabetes mellitus with diabetic polyneuropathy, without long-term current use of insulin (HCC) 2022    Back pain 2022    HTN (hypertension) 2022    Pain in both hands 2022    Pain, dental 2022    Hip pain 2022    Type 2 DM with diabetic neuropathy affecting both sides of body (HCC) 10/08/2021    Dehydration 2015       Current Outpatient Medications   Medication Sig Dispense Refill    Dulaglutide (TRULICITY) 3 MG/0.5ML Solution Pen-injector Inject 3 mg under the skin every 7 days. 0.5 mL 3    lisinopril (PRINIVIL) 10 MG Tab Take 1 Tablet by mouth every day. 90 Tablet 3    cyclobenzaprine (FLEXERIL) 5 mg tablet Take 1 Tablet by mouth " "3 times a day as needed for Muscle Spasms or Moderate Pain. 30 Tablet 0    mupirocin (BACTROBAN) 2 % Ointment Apply 1 Application topically 2 times a day for 10 days. 22 g 0    albuterol 108 (90 Base) MCG/ACT Aero Soln inhalation aerosol Inhale 2 Puffs every four hours as needed for Shortness of Breath. 1 Each 2    citalopram (CELEXA) 40 MG Tab Take 1 Tablet by mouth every day. 90 Tablet 3    Dulaglutide (TRULICITY) 1.5 MG/0.5ML Solution Pen-injector INJECT 1.5 MG SUBCUTANEOUSLY  ONCE A WEEK 4 mL 3    gabapentin (NEURONTIN) 600 MG tablet Take 1 Tablet by mouth 3 times a day. 270 Tablet 3    simvastatin (ZOCOR) 20 MG Tab Take 1 Tablet by mouth every evening. 90 Tablet 3     No current facility-administered medications for this visit.         Allergies as of 01/17/2023    (No Known Allergies)          /76 (BP Location: Left arm, Patient Position: Sitting, BP Cuff Size: Adult)   Pulse 95   Temp 36.6 °C (97.9 °F) (Temporal)   Resp 17   Ht 1.702 m (5' 7\")   Wt 77.1 kg (170 lb)   SpO2 98%   BMI 26.63 kg/m²     Gen: no fever/chills  CV: No chest pain  Resp: No SOB  GI/: No bowel or bladder complaints  Psych: No depression      Physical Exam:  General: Well-appearing and in no acute distress  HEENT: MMM, EOMI - erythema at tip most medial aspect of b/l nostril- no discharge. 1mm skin colored papule. Not erythematous.   Lungs: No respiratory distress or audible wheezing  Heart: Pulse present  Abdomen: Nondistended.  Skin: No rashes or lesions visible  : Perineum and external genitalia normal without rash. Vagina with normal and physiologic discharge. Cervix without visible lesions or discharge. Bimanual exam without adnexal masses or cervical motion tenderness. Cervix friable.  EXT: Warm and well-perfused  Neuro: Nonfocal        Assessment and Plan  Will start Bactroban for nares, follow up in 2 weeks to see if improves. Discussed to not touch, pick or mess with nose as much as possible to allow to " "heal.  Increase Trulicity 1.5 to 3.0 as tolerated  Pap today, will discuss results at next visit  Reprinted labs, encouraged to complete  Will discuss referral to PT, appears last referral was closed. Patient knows she \"needs to go\" but life situation is complex, will discuss planning a time to dedicate as will help with hip/back pain.    57 y.o. female with the following-  Problem List Items Addressed This Visit       Type 2 DM with diabetic neuropathy affecting both sides of body (HCC)    Relevant Medications    Dulaglutide (TRULICITY) 3 MG/0.5ML Solution Pen-injector    Other Relevant Orders    POCT A1C (Completed)    Hip pain    Relevant Medications    cyclobenzaprine (FLEXERIL) 5 mg tablet    HTN (hypertension)    Relevant Medications    lisinopril (PRINIVIL) 10 MG Tab     Other Visit Diagnoses       BIJU I (cervical intraepithelial neoplasia I)        Relevant Orders    PAP IG, RFX HPV ALL PTH 16&18    Lesion of nose        Relevant Medications    mupirocin (BACTROBAN) 2 % Ointment          F/U 02/01 for follow up.    Lindsey Marroquin M.D.       "

## 2023-01-19 ENCOUNTER — TELEPHONE (OUTPATIENT)
Dept: SCHEDULING | Facility: IMAGING CENTER | Age: 58
End: 2023-01-19

## 2023-01-31 ENCOUNTER — PATIENT MESSAGE (OUTPATIENT)
Dept: MEDICAL GROUP | Facility: CLINIC | Age: 58
End: 2023-01-31
Payer: MEDICAID

## 2023-02-03 DIAGNOSIS — I10 PRIMARY HYPERTENSION: ICD-10-CM

## 2023-02-03 DIAGNOSIS — E11.42 TYPE 2 DM WITH DIABETIC NEUROPATHY AFFECTING BOTH SIDES OF BODY (HCC): ICD-10-CM

## 2023-02-03 DIAGNOSIS — Z13.79 GENETIC SCREENING: ICD-10-CM

## 2023-03-13 DIAGNOSIS — M25.559 HIP PAIN: ICD-10-CM

## 2023-03-13 RX ORDER — CYCLOBENZAPRINE HCL 5 MG
5 TABLET ORAL 3 TIMES DAILY PRN
Qty: 30 TABLET | Refills: 0 | Status: SHIPPED | OUTPATIENT
Start: 2023-03-13 | End: 2023-08-18 | Stop reason: SDUPTHER

## 2023-08-17 DIAGNOSIS — E11.42 TYPE 2 DM WITH DIABETIC NEUROPATHY AFFECTING BOTH SIDES OF BODY (HCC): ICD-10-CM

## 2023-08-17 RX ORDER — DULAGLUTIDE 3 MG/.5ML
3 INJECTION, SOLUTION SUBCUTANEOUS
Qty: 0.5 ML | Refills: 3 | Status: SHIPPED | OUTPATIENT
Start: 2023-08-17 | End: 2023-09-28 | Stop reason: SDUPTHER

## 2023-08-18 DIAGNOSIS — J45.909 ASTHMA, UNSPECIFIED ASTHMA SEVERITY, UNSPECIFIED WHETHER COMPLICATED, UNSPECIFIED WHETHER PERSISTENT: ICD-10-CM

## 2023-08-18 DIAGNOSIS — M25.559 HIP PAIN: ICD-10-CM

## 2023-08-18 RX ORDER — CYCLOBENZAPRINE HCL 5 MG
5 TABLET ORAL 3 TIMES DAILY PRN
Qty: 30 TABLET | Refills: 0 | Status: SHIPPED | OUTPATIENT
Start: 2023-08-18 | End: 2023-11-21 | Stop reason: SDUPTHER

## 2023-08-18 RX ORDER — ALBUTEROL SULFATE 90 UG/1
2 AEROSOL, METERED RESPIRATORY (INHALATION) EVERY 4 HOURS PRN
Qty: 1 EACH | Refills: 2 | Status: SHIPPED | OUTPATIENT
Start: 2023-08-18 | End: 2023-11-21 | Stop reason: SDUPTHER

## 2023-09-28 ENCOUNTER — TELEMEDICINE (OUTPATIENT)
Dept: MEDICAL GROUP | Facility: OTHER | Age: 58
End: 2023-09-28
Payer: MEDICAID

## 2023-09-28 VITALS — BODY MASS INDEX: 27 KG/M2 | WEIGHT: 172 LBS | HEIGHT: 67 IN

## 2023-09-28 DIAGNOSIS — G62.9 NEUROPATHY: ICD-10-CM

## 2023-09-28 DIAGNOSIS — F43.20 ADJUSTMENT DISORDER, UNSPECIFIED TYPE: ICD-10-CM

## 2023-09-28 DIAGNOSIS — F32.A DEPRESSION, UNSPECIFIED DEPRESSION TYPE: ICD-10-CM

## 2023-09-28 DIAGNOSIS — E11.42 TYPE 2 DM WITH DIABETIC NEUROPATHY AFFECTING BOTH SIDES OF BODY (HCC): ICD-10-CM

## 2023-09-28 DIAGNOSIS — I10 PRIMARY HYPERTENSION: ICD-10-CM

## 2023-09-28 DIAGNOSIS — M25.529 ARTHRALGIA OF ELBOW, UNSPECIFIED LATERALITY: ICD-10-CM

## 2023-09-28 DIAGNOSIS — G43.809 OTHER MIGRAINE WITHOUT STATUS MIGRAINOSUS, NOT INTRACTABLE: ICD-10-CM

## 2023-09-28 PROCEDURE — 99213 OFFICE O/P EST LOW 20 MIN: CPT | Performed by: FAMILY MEDICINE

## 2023-09-28 RX ORDER — GABAPENTIN 600 MG/1
600 TABLET ORAL 3 TIMES DAILY
Qty: 270 TABLET | Refills: 3 | Status: SHIPPED | OUTPATIENT
Start: 2023-09-28 | End: 2023-11-21 | Stop reason: SDUPTHER

## 2023-09-28 RX ORDER — LISINOPRIL 10 MG/1
10 TABLET ORAL DAILY
Qty: 90 TABLET | Refills: 3 | Status: SHIPPED | OUTPATIENT
Start: 2023-09-28 | End: 2023-11-21 | Stop reason: SDUPTHER

## 2023-09-28 RX ORDER — CITALOPRAM 40 MG/1
40 TABLET ORAL DAILY
Qty: 90 TABLET | Refills: 1 | Status: SHIPPED | OUTPATIENT
Start: 2023-09-28 | End: 2023-11-21 | Stop reason: SDUPTHER

## 2023-09-28 RX ORDER — DULAGLUTIDE 3 MG/.5ML
3 INJECTION, SOLUTION SUBCUTANEOUS
Qty: 0.5 ML | Refills: 3 | Status: SHIPPED | OUTPATIENT
Start: 2023-09-28 | End: 2023-11-15

## 2023-09-28 RX ORDER — SUMATRIPTAN 50 MG/1
50 TABLET, FILM COATED ORAL
Qty: 10 TABLET | Refills: 3 | Status: SHIPPED | OUTPATIENT
Start: 2023-09-28 | End: 2023-10-09 | Stop reason: SDUPTHER

## 2023-09-28 RX ORDER — IBUPROFEN 800 MG/1
800 TABLET ORAL EVERY 8 HOURS PRN
Qty: 50 TABLET | Refills: 1 | Status: SHIPPED | OUTPATIENT
Start: 2023-09-28 | End: 2023-11-16 | Stop reason: SDUPTHER

## 2023-09-28 NOTE — PROGRESS NOTES
Virtual Visit: Established Patient   This visit was conducted via Zoom using secure and encrypted videoconferencing technology.   The patient was in their home in the state of [place].    The patient's identity was confirmed and verbal consent was obtained for this virtual visit.    Subjective:   CC:   Chief Complaint   Patient presents with    Follow-Up     Referral for physical therapy  Annual lab orders  Medication refills ibuprofen 800mg     Chantale Greer is a 57 y.o. female presenting for evaluation and management of:    Her DM and for a refill on all of her meds - states that she needs labs before her visit with us in a month or so ...    ROS   Pt denies cp sob weight loss. Denies NVFC or D     Current medicines (including changes today)  Current Outpatient Medications   Medication Sig Dispense Refill    Dulaglutide (TRULICITY) 3 MG/0.5ML Solution Pen-injector Inject 3 mg under the skin every 7 days for 90 days. 0.5 mL 3    gabapentin (NEURONTIN) 600 MG tablet Take 1 Tablet by mouth 3 times a day. 270 Tablet 3    lisinopril (PRINIVIL) 10 MG Tab Take 1 Tablet by mouth every day. 90 Tablet 3    citalopram (CELEXA) 40 MG Tab Take 1 Tablet by mouth every day for 180 days. 90 Tablet 1    ibuprofen (MOTRIN) 800 MG Tab Take 1 Tablet by mouth every 8 hours as needed for Moderate Pain. 50 Tablet 1    SUMAtriptan (IMITREX) 50 MG Tab Take 1 Tablet by mouth one time as needed for Migraine for up to 1 dose. 10 Tablet 3    albuterol 108 (90 Base) MCG/ACT Aero Soln inhalation aerosol Inhale 2 Puffs every four hours as needed for Shortness of Breath. 1 Each 2    cyclobenzaprine (FLEXERIL) 5 mg tablet Take 1 Tablet by mouth 3 times a day as needed for Muscle Spasms or Moderate Pain. 30 Tablet 0    simvastatin (ZOCOR) 20 MG Tab Take 1 Tablet by mouth every evening. 90 Tablet 3    Dulaglutide (TRULICITY) 1.5 MG/0.5ML Solution Pen-injector INJECT 1.5 MG SUBCUTANEOUSLY  ONCE A WEEK 4 mL 3     No current facility-administered  "medications for this visit.       Patient Active Problem List    Diagnosis Date Noted    Acute pharyngitis 11/18/2022    Nausea 11/04/2022    Dysuria 11/04/2022    Screening for colon cancer 11/04/2022    Asthma 11/04/2022    Family history of psoriatic arthritis 11/04/2022    Neuropathy 11/04/2022    Depression 05/17/2022    Type 2 diabetes mellitus with diabetic polyneuropathy, without long-term current use of insulin (HCC) 05/17/2022    Back pain 05/17/2022    HTN (hypertension) 05/17/2022    Pain in both hands 05/17/2022    Pain, dental 02/24/2022    Hip pain 02/24/2022    Type 2 DM with diabetic neuropathy affecting both sides of body (HCC) 10/08/2021    Dehydration 08/17/2015        Objective:   Ht 1.702 m (5' 7\")   Wt 78 kg (172 lb)   BMI 26.94 kg/m²     Physical Exam:  Constitutional: Alert, no distress, well-groomed.  Skin: No rashes in visible areas.  Eye: Round. Conjunctiva clear, lids normal. No icterus.   ENMT: Lips pink without lesions, good dentition, moist mucous membranes. Phonation normal.  Neck: No masses, no thyromegaly. Moves freely without pain.  Respiratory: Unlabored respiratory effort, no cough or audible wheeze  Psych: Alert and oriented x3, normal affect and mood.     Assessment and Plan:   The following treatment plan was discussed:   DM - will refill meds and time for labs     Neuropathy - sequelae of DM I would guess - will check labs - fu as scheduled -       Follow-up: No follow-ups on file.         "

## 2023-10-09 DIAGNOSIS — G43.809 OTHER MIGRAINE WITHOUT STATUS MIGRAINOSUS, NOT INTRACTABLE: ICD-10-CM

## 2023-10-13 RX ORDER — SUMATRIPTAN 50 MG/1
50 TABLET, FILM COATED ORAL
Qty: 10 TABLET | Refills: 3 | Status: SHIPPED | OUTPATIENT
Start: 2023-10-13 | End: 2023-11-21 | Stop reason: SDUPTHER

## 2023-11-10 DIAGNOSIS — E11.42 TYPE 2 DM WITH DIABETIC NEUROPATHY AFFECTING BOTH SIDES OF BODY (HCC): ICD-10-CM

## 2023-11-15 RX ORDER — DULAGLUTIDE 3 MG/.5ML
3 INJECTION, SOLUTION SUBCUTANEOUS
Qty: 2 ML | Refills: 3 | Status: SHIPPED | OUTPATIENT
Start: 2023-11-15 | End: 2023-11-21 | Stop reason: SDUPTHER

## 2023-11-16 RX ORDER — IBUPROFEN 800 MG/1
800 TABLET ORAL EVERY 8 HOURS PRN
Qty: 50 TABLET | Refills: 1 | Status: SHIPPED | OUTPATIENT
Start: 2023-11-16 | End: 2023-11-21 | Stop reason: SDUPTHER

## 2023-11-21 DIAGNOSIS — E11.42 TYPE 2 DM WITH DIABETIC NEUROPATHY AFFECTING BOTH SIDES OF BODY (HCC): ICD-10-CM

## 2023-11-21 DIAGNOSIS — J45.909 ASTHMA, UNSPECIFIED ASTHMA SEVERITY, UNSPECIFIED WHETHER COMPLICATED, UNSPECIFIED WHETHER PERSISTENT: ICD-10-CM

## 2023-11-21 DIAGNOSIS — G43.809 OTHER MIGRAINE WITHOUT STATUS MIGRAINOSUS, NOT INTRACTABLE: ICD-10-CM

## 2023-11-21 DIAGNOSIS — G62.9 NEUROPATHY: ICD-10-CM

## 2023-11-21 DIAGNOSIS — F32.A DEPRESSION, UNSPECIFIED DEPRESSION TYPE: ICD-10-CM

## 2023-11-21 DIAGNOSIS — M25.559 HIP PAIN: ICD-10-CM

## 2023-11-21 DIAGNOSIS — I10 PRIMARY HYPERTENSION: ICD-10-CM

## 2023-11-21 RX ORDER — ALBUTEROL SULFATE 90 UG/1
2 AEROSOL, METERED RESPIRATORY (INHALATION) EVERY 4 HOURS PRN
Qty: 1 EACH | Refills: 2 | Status: SHIPPED | OUTPATIENT
Start: 2023-11-21 | End: 2024-02-06

## 2023-11-21 RX ORDER — CYCLOBENZAPRINE HCL 5 MG
5 TABLET ORAL 3 TIMES DAILY PRN
Qty: 30 TABLET | Refills: 0 | Status: SHIPPED | OUTPATIENT
Start: 2023-11-21

## 2023-11-22 RX ORDER — IBUPROFEN 800 MG/1
800 TABLET ORAL EVERY 8 HOURS PRN
Qty: 50 TABLET | Refills: 1 | Status: SHIPPED | OUTPATIENT
Start: 2023-11-22

## 2023-11-22 RX ORDER — DULAGLUTIDE 3 MG/.5ML
3 INJECTION, SOLUTION SUBCUTANEOUS
Qty: 2 ML | Refills: 3 | Status: SHIPPED | OUTPATIENT
Start: 2023-11-22 | End: 2024-02-20

## 2023-11-22 RX ORDER — GABAPENTIN 600 MG/1
600 TABLET ORAL 3 TIMES DAILY
Qty: 270 TABLET | Refills: 3 | Status: SHIPPED | OUTPATIENT
Start: 2023-11-22

## 2023-11-22 RX ORDER — SUMATRIPTAN 50 MG/1
50 TABLET, FILM COATED ORAL
Qty: 10 TABLET | Refills: 3 | Status: SHIPPED | OUTPATIENT
Start: 2023-11-22

## 2023-11-22 RX ORDER — CITALOPRAM 40 MG/1
40 TABLET ORAL DAILY
Qty: 90 TABLET | Refills: 1 | Status: SHIPPED | OUTPATIENT
Start: 2023-11-22 | End: 2024-05-20

## 2023-11-22 RX ORDER — LISINOPRIL 10 MG/1
10 TABLET ORAL DAILY
Qty: 90 TABLET | Refills: 3 | Status: SHIPPED | OUTPATIENT
Start: 2023-11-22

## 2024-02-03 ENCOUNTER — TELEPHONE (OUTPATIENT)
Dept: MEDICAL GROUP | Facility: CLINIC | Age: 59
End: 2024-02-03
Payer: MEDICAID

## 2024-02-03 DIAGNOSIS — E11.42 TYPE 2 DIABETES MELLITUS WITH DIABETIC POLYNEUROPATHY, WITHOUT LONG-TERM CURRENT USE OF INSULIN (HCC): ICD-10-CM

## 2024-02-05 DIAGNOSIS — J45.909 ASTHMA, UNSPECIFIED ASTHMA SEVERITY, UNSPECIFIED WHETHER COMPLICATED, UNSPECIFIED WHETHER PERSISTENT: ICD-10-CM

## 2024-02-05 NOTE — TELEPHONE ENCOUNTER
Received request via: Pharmacy    Was the patient seen in the last year in this department? Yes    Does the patient have an active prescription (recently filled or refills available) for medication(s) requested? No    Pharmacy Name: [facility] [place]    Does the patient have USP Plus and need 100 day supply (blood pressure, diabetes and cholesterol meds only)? Patient does not have SCP

## 2024-04-04 DIAGNOSIS — I10 PRIMARY HYPERTENSION: ICD-10-CM

## 2024-04-04 DIAGNOSIS — J45.909 ASTHMA, UNSPECIFIED ASTHMA SEVERITY, UNSPECIFIED WHETHER COMPLICATED, UNSPECIFIED WHETHER PERSISTENT: ICD-10-CM

## 2024-04-04 DIAGNOSIS — G62.9 NEUROPATHY: ICD-10-CM

## 2024-04-04 DIAGNOSIS — M25.559 HIP PAIN: ICD-10-CM

## 2024-04-04 DIAGNOSIS — G43.809 OTHER MIGRAINE WITHOUT STATUS MIGRAINOSUS, NOT INTRACTABLE: ICD-10-CM

## 2024-04-05 NOTE — TELEPHONE ENCOUNTER
Received request via: Pharmacy    Was the patient seen in the last year in this department? Yes    Does the patient have an active prescription (recently filled or refills available) for medication(s) requested? No    Pharmacy Name:   y: [id number] - [place], [place] - [address] [facility]

## 2024-04-05 NOTE — TELEPHONE ENCOUNTER
Received request via: Pharmacy    Was the patient seen in the last year in this department? Yes    Does the patient have an active prescription (recently filled or refills available) for medication(s) requested? No    Pharmacy Name:    [id number] - [place], [place] - [address] At [facility]

## 2024-04-06 ENCOUNTER — TELEPHONE (OUTPATIENT)
Dept: MEDICAL GROUP | Facility: CLINIC | Age: 59
End: 2024-04-06
Payer: MEDICAID

## 2024-04-06 RX ORDER — CYCLOBENZAPRINE HCL 5 MG
5 TABLET ORAL 3 TIMES DAILY PRN
Qty: 30 TABLET | Refills: 0 | Status: SHIPPED | OUTPATIENT
Start: 2024-04-06

## 2024-04-06 RX ORDER — ALBUTEROL SULFATE 90 UG/1
2 AEROSOL, METERED RESPIRATORY (INHALATION) EVERY 4 HOURS PRN
Qty: 8.5 EACH | Refills: 2 | Status: SHIPPED | OUTPATIENT
Start: 2024-04-06

## 2024-04-08 RX ORDER — IBUPROFEN 800 MG/1
800 TABLET ORAL EVERY 8 HOURS PRN
Qty: 50 TABLET | Refills: 1 | Status: SHIPPED | OUTPATIENT
Start: 2024-04-08

## 2024-04-08 RX ORDER — SUMATRIPTAN 50 MG/1
50 TABLET, FILM COATED ORAL
Qty: 10 TABLET | Refills: 3 | Status: SHIPPED | OUTPATIENT
Start: 2024-04-08

## 2024-04-08 RX ORDER — LISINOPRIL 10 MG/1
10 TABLET ORAL DAILY
Qty: 90 TABLET | Refills: 3 | Status: SHIPPED | OUTPATIENT
Start: 2024-04-08

## 2024-04-08 RX ORDER — GABAPENTIN 600 MG/1
600 TABLET ORAL 3 TIMES DAILY
Qty: 270 TABLET | Refills: 3 | Status: SHIPPED | OUTPATIENT
Start: 2024-04-08

## 2024-04-11 ENCOUNTER — RESEARCH ENCOUNTER (OUTPATIENT)
Dept: RESEARCH | Facility: MEDICAL CENTER | Age: 59
End: 2024-04-11
Payer: MEDICAID

## 2024-04-11 DIAGNOSIS — Z13.79 GENETIC SCREENING: ICD-10-CM

## 2024-04-11 DIAGNOSIS — Z00.6 RESEARCH STUDY PATIENT: ICD-10-CM

## 2024-04-11 NOTE — RESEARCH NOTE
Confirmed with the participant which designated provider they would like study results shared with (Treva Chakraborty). Patient will have an opportunity to share the results with any providers of their choosing in the future by accessing their results from [facility].

## 2024-04-23 ENCOUNTER — APPOINTMENT (OUTPATIENT)
Dept: LAB | Facility: MEDICAL CENTER | Age: 59
End: 2024-04-23
Attending: FAMILY MEDICINE

## 2024-04-23 ENCOUNTER — HOSPITAL ENCOUNTER (OUTPATIENT)
Dept: LAB | Facility: MEDICAL CENTER | Age: 59
End: 2024-04-23
Attending: FAMILY MEDICINE
Payer: MEDICAID

## 2024-04-23 DIAGNOSIS — Z00.6 RESEARCH STUDY PATIENT: ICD-10-CM

## 2024-04-26 DIAGNOSIS — R79.89 ABNORMAL LIVER FUNCTION TEST: ICD-10-CM

## 2024-04-26 LAB
ELF SCORE: 10.69 - (ref 9.8–11.3)
RELATIVE RISK: ABNORMAL
RISK GROUP: ABNORMAL
RISK: 23.6 %

## 2024-04-27 NOTE — RESULT ENCOUNTER NOTE
Dear Chantale,    You recently participated in [facility]'s BANER[facility] genetic research study.  Your result showed you are at high-risk for liver fibrosis with an ELF score more than 11.2 (your score was  10.69).     This elevated score is a marker and lets us know that you have a 57% risk of developing conditions (such as liver fibrosis) over the next 5 years. Risk does not mean that you have liver disease, but it does mean that some additional follow-up may be required to assist you with your overall health. High risk recommendations include making the following changes...    Reduce (or eliminate!) carbonated or sweetened drinks.  Avoid food or drink containing high fructose corn syrup.  Do not eat processed foods.  Control chronic conditions such as diabetes and elevated cholesterol.   Consider a consult with a nutritionist.    Additional testing is beyond the scope of the study and would be subject to insurance coverage, co-pays, and deductibles.     There are lab tests that we would like to obtain to monitor your liver function.  I have ordered them.  You can go to any [facility] [place] lab to have them drawn. These blood tests are hepatitis B and C, alpha-1-antitrypsin, iron and iron saturation, STUART, liver function tests, and platelet count.     We also want to obtain a liver ultrasound (to include the spleen and portal vein size) and elastography (also called a FibroScan).    After these tests are resulted, a visit with a gastroenterologist is warranted.  Gastroenterologists are liver specialists and can help with management.    Call our office to make an appointment.      Best,  Shirlene Patrick MD

## 2024-04-30 ENCOUNTER — TELEMEDICINE (OUTPATIENT)
Dept: MEDICAL GROUP | Facility: CLINIC | Age: 59
End: 2024-04-30
Payer: MEDICAID

## 2024-04-30 DIAGNOSIS — E11.42 TYPE 2 DM WITH DIABETIC NEUROPATHY AFFECTING BOTH SIDES OF BODY (HCC): ICD-10-CM

## 2024-04-30 PROCEDURE — 99213 OFFICE O/P EST LOW 20 MIN: CPT | Performed by: FAMILY MEDICINE

## 2024-04-30 RX ORDER — CEPHALEXIN 500 MG/1
CAPSULE ORAL
COMMUNITY
Start: 2024-04-09

## 2024-05-03 RX ORDER — CHLORHEXIDINE GLUCONATE ORAL RINSE 1.2 MG/ML
15 SOLUTION DENTAL 2 TIMES DAILY
Qty: 500 ML | Refills: 3 | Status: SHIPPED | OUTPATIENT
Start: 2024-05-03

## 2024-05-04 DIAGNOSIS — I10 PRIMARY HYPERTENSION: ICD-10-CM

## 2024-05-04 DIAGNOSIS — E11.42 TYPE 2 DM WITH DIABETIC NEUROPATHY AFFECTING BOTH SIDES OF BODY (HCC): ICD-10-CM

## 2024-05-04 DIAGNOSIS — F32.A DEPRESSION, UNSPECIFIED DEPRESSION TYPE: ICD-10-CM

## 2024-05-07 NOTE — ASSESSMENT & PLAN NOTE
Patient with diabetes compliant with her medications going to order a hemoglobin A1c on her and it is important that she follows up in person next time for evaluation of her neuropathy and for continued care for her diabetes.

## 2024-05-07 NOTE — PROGRESS NOTES
Virtual Visit: Established Patient   This visit was conducted via Zoom using secure and encrypted videoconferencing technology.   The patient was in their home in the state of [place].    The patient's identity was confirmed and verbal consent was obtained for this virtual visit.    Subjective:   CC:   Chief Complaint   Patient presents with    Diabetes Follow-up     Chantale Greer is a 58 y.o. female presenting for evaluation and management of:    Patient in need of follow-up for diabetes.  Her last hemoglobin A1c which was done about 4 to 5 months ago was 11.  She states that she has been compliant with her medications and is wanting to get another round of A1c's.  She denies polyuria polydipsia.  States that her neuropathy is stable    ROS   Denies nausea vomiting fever chills or diarrhea no chest pain or shortness of breath    Current medicines (including changes today)  Current Outpatient Medications   Medication Sig Dispense Refill    SUMAtriptan (IMITREX) 50 MG Tab Take 1 Tablet by mouth one time as needed for Migraine for up to 1 dose. 10 Tablet 3    gabapentin (NEURONTIN) 600 MG tablet Take 1 Tablet by mouth 3 times a day. 270 Tablet 3    lisinopril (PRINIVIL) 10 MG Tab Take 1 Tablet by mouth every day. 90 Tablet 3    ibuprofen (MOTRIN) 800 MG Tab Take 1 Tablet by mouth every 8 hours as needed for Moderate Pain. 50 Tablet 1    albuterol (PROAIR HFA) 108 (90 Base) MCG/ACT Aero Soln inhalation aerosol Inhale 2 Puffs every four hours as needed for Shortness of Breath. 8.5 Each 2    cyclobenzaprine (FLEXERIL) 5 mg tablet Take 1 Tablet by mouth 3 times a day as needed for Muscle Spasms or Moderate Pain. 30 Tablet 0    citalopram (CELEXA) 40 MG Tab Take 1 Tablet by mouth every day for 180 days. 90 Tablet 1    simvastatin (ZOCOR) 20 MG Tab Take 1 Tablet by mouth every evening. 90 Tablet 3    chlorhexidine (PERIDEX) 0.12 % Solution Take 15 mL by mouth 2 times a day. 500 mL 3    cephALEXin (KEFLEX) 500 MG Cap TAKE  1 CAPSULE BY MOUTH 4 TIMES A DAY FOR 5 DAYS (Patient not taking: Reported on 4/30/2024)      Dulaglutide (TRULICITY) 1.5 MG/0.5ML Solution Pen-injector INJECT 1.5 MG SUBCUTANEOUSLY  ONCE A WEEK 4 mL 3     No current facility-administered medications for this visit.       Patient Active Problem List    Diagnosis Date Noted    Acute pharyngitis 11/18/2022    Nausea 11/04/2022    Dysuria 11/04/2022    Screening for colon cancer 11/04/2022    Asthma 11/04/2022    Family history of psoriatic arthritis 11/04/2022    Neuropathy 11/04/2022    Depression 05/17/2022    Type 2 diabetes mellitus with diabetic polyneuropathy, without long-term current use of insulin (HCC) 05/17/2022    Back pain 05/17/2022    HTN (hypertension) 05/17/2022    Pain in both hands 05/17/2022    Pain, dental 02/24/2022    Hip pain 02/24/2022    Type 2 DM with diabetic neuropathy affecting both sides of body ([facility]) 10/08/2021    Dehydration 08/17/2015        Objective:   There were no vitals taken for this visit.    Physical Exam:  Constitutional: Alert, no distress, well-groomed.  Skin: No rashes in visible areas.  Eye: Round. Conjunctiva clear, lids normal. No icterus.   ENMT: Lips pink without lesions, good dentition, moist mucous membranes. Phonation normal.  Neck: No masses, no thyromegaly. Moves freely without pain.  Respiratory: Unlabored respiratory effort, no cough or audible wheeze  Psych: Alert and oriented x3, normal affect and mood.     Assessment and Plan:   The following treatment plan was discussed:     1. Type 2 DM with diabetic neuropathy affecting both sides of body (HCC)    Other orders  - cephALEXin (KEFLEX) 500 MG Cap; TAKE 1 CAPSULE BY MOUTH 4 TIMES A DAY FOR 5 DAYS (Patient not taking: Reported on 4/30/2024)    Patient with diabetes compliant with her medications going to order a hemoglobin A1c on her and it is important that she follows up in person next time for evaluation of her neuropathy and for continued care for her  diabetes.  Follow-up: Return in about 1 month (around 5/30/2024), or if symptoms worsen or fail to improve.

## 2024-05-07 NOTE — PROGRESS NOTES
Subjective:   CC:   Chief Complaint   Patient presents with    Diabetes Follow-up       HPI: Chantale is a 58 y.o. female who presents today for the following problems:    No problems updated.    Current Outpatient Medications   Medication Sig Dispense Refill    SUMAtriptan (IMITREX) 50 MG Tab Take 1 Tablet by mouth one time as needed for Migraine for up to 1 dose. 10 Tablet 3    gabapentin (NEURONTIN) 600 MG tablet Take 1 Tablet by mouth 3 times a day. 270 Tablet 3    lisinopril (PRINIVIL) 10 MG Tab Take 1 Tablet by mouth every day. 90 Tablet 3    ibuprofen (MOTRIN) 800 MG Tab Take 1 Tablet by mouth every 8 hours as needed for Moderate Pain. 50 Tablet 1    albuterol (PROAIR HFA) 108 (90 Base) MCG/ACT Aero Soln inhalation aerosol Inhale 2 Puffs every four hours as needed for Shortness of Breath. 8.5 Each 2    cyclobenzaprine (FLEXERIL) 5 mg tablet Take 1 Tablet by mouth 3 times a day as needed for Muscle Spasms or Moderate Pain. 30 Tablet 0    citalopram (CELEXA) 40 MG Tab Take 1 Tablet by mouth every day for 180 days. 90 Tablet 1    simvastatin (ZOCOR) 20 MG Tab Take 1 Tablet by mouth every evening. 90 Tablet 3    chlorhexidine (PERIDEX) 0.12 % Solution Take 15 mL by mouth 2 times a day. 500 mL 3    cephALEXin (KEFLEX) 500 MG Cap TAKE 1 CAPSULE BY MOUTH 4 TIMES A DAY FOR 5 DAYS (Patient not taking: Reported on 2024)      Dulaglutide (TRULICITY) 1.5 MG/0.5ML Solution Pen-injector INJECT 1.5 MG SUBCUTANEOUSLY  ONCE A WEEK 4 mL 3     No current facility-administered medications for this visit.       Social History     Tobacco Use    Smoking status: Former     Current packs/day: 0.00     Types: Cigarettes     Quit date: 2018     Years since quittin.3    Smokeless tobacco: Never   Vaping Use    Vaping Use: Former    Substances: Nicotine    Devices: Disposable   Substance Use Topics    Alcohol use: Never    Drug use: Never       ROS      Objective:   There were no vitals filed for this visit.  There is no height  or weight on file to calculate BMI.     Physical Exam     Assessment & Plan:   No problem-specific Assessment & Plan notes found for this encounter.      Followup: No follow-ups on file.    Celso Bass M.D.    Please note that this dictation was created using voice recognition software. I have made every reasonable attempt to correct obvious errors, but I expect that there are errors of grammar and possibly content that I did not discover before finalizing the note.

## 2024-05-13 DIAGNOSIS — M25.559 HIP PAIN: ICD-10-CM

## 2024-05-14 LAB
APOB+LDLR+PCSK9 GENE MUT ANL BLD/T: NOT DETECTED
BRCA1+BRCA2 DEL+DUP + FULL MUT ANL BLD/T: NOT DETECTED
MLH1+MSH2+MSH6+PMS2 GN DEL+DUP+FUL M: NOT DETECTED

## 2024-05-15 DIAGNOSIS — E11.42 TYPE 2 DIABETES MELLITUS WITH DIABETIC POLYNEUROPATHY, WITHOUT LONG-TERM CURRENT USE OF INSULIN (HCC): ICD-10-CM

## 2024-05-15 RX ORDER — CYCLOBENZAPRINE HCL 5 MG
5 TABLET ORAL 3 TIMES DAILY PRN
Qty: 30 TABLET | Refills: 0 | Status: SHIPPED | OUTPATIENT
Start: 2024-05-15

## 2024-05-15 RX ORDER — DULAGLUTIDE 1.5 MG/.5ML
INJECTION, SOLUTION SUBCUTANEOUS
Qty: 4 ML | Refills: 3 | Status: SHIPPED | OUTPATIENT
Start: 2024-05-15 | End: 2024-05-20

## 2024-05-17 DIAGNOSIS — E11.42 TYPE 2 DIABETES MELLITUS WITH DIABETIC POLYNEUROPATHY, WITHOUT LONG-TERM CURRENT USE OF INSULIN (HCC): ICD-10-CM

## 2024-05-17 RX ORDER — DULAGLUTIDE 3 MG/.5ML
INJECTION, SOLUTION SUBCUTANEOUS
COMMUNITY
End: 2024-05-17 | Stop reason: SDUPTHER

## 2024-05-17 NOTE — TELEPHONE ENCOUNTER
Received request via: Patient    Was the patient seen in the last year in this department? Yes    Does the patient have an active prescription (recently filled or refills available) for medication(s) requested? No    Pharm[facility] Name: [facility]

## 2024-05-18 DIAGNOSIS — E11.42 TYPE 2 DIABETES MELLITUS WITH DIABETIC POLYNEUROPATHY, WITHOUT LONG-TERM CURRENT USE OF INSULIN (HCC): ICD-10-CM

## 2024-05-18 DIAGNOSIS — E11.42 TYPE 2 DM WITH DIABETIC NEUROPATHY AFFECTING BOTH SIDES OF BODY (HCC): ICD-10-CM

## 2024-05-19 ENCOUNTER — TELEPHONE (OUTPATIENT)
Dept: HOSPITALIST | Facility: MEDICAL CENTER | Age: 59
End: 2024-05-19
Payer: MEDICAID

## 2024-05-19 DIAGNOSIS — E11.42 TYPE 2 DM WITH DIABETIC NEUROPATHY AFFECTING BOTH SIDES OF BODY (HCC): ICD-10-CM

## 2024-05-19 RX ORDER — INSULIN GLARGINE 100 [IU]/ML
10 INJECTION, SOLUTION SUBCUTANEOUS EVERY EVENING
Qty: 10 ML | Refills: 0 | Status: SHIPPED | OUTPATIENT
Start: 2024-05-19

## 2024-05-19 NOTE — TELEPHONE ENCOUNTER
Received page from on-call service for patient requesting a phone call regarding her insulin and need for prior authorization.  Spoke with patient who reports she is an insulin-dependent diabetic and needs insulin however she has been unable to  from the pharmacy as they need a prior authorization.  Called to discuss with [facility] pharmacy and asked if there was an alternative that can be sent in in the time being until a prior Auth is approved and they did not have any recommendations for this as it is not saying why these are denied.  Called back patient to further discuss pharmacy report.  Will send in new prescription for glargine with note for pharmacy to provide per insurance preference.  Discussed with patient that a message will be sent to the office for follow-up on this tomorrow.  She verbalized agreement and understanding.

## 2024-05-19 NOTE — PROGRESS NOTES
Patient states that insulin prescription that Dr. Bass sent through yesterday was not received by her pharmacy. Patient notes history of hyperglycemia when out of her medications, notes some recent polyuria. Current home glucometer reading 95mg/dl. Counseled patient on reasons to call 911 or to be seen in the ED. Advised her to reach out to her pharmacy to let them know paperwork is done and to call the clinic on Monday as prior authorization for Trulicity was filled out today. Will resend the prescription at this time.

## 2024-05-20 DIAGNOSIS — E11.42 TYPE 2 DM WITH DIABETIC NEUROPATHY AFFECTING BOTH SIDES OF BODY (HCC): ICD-10-CM

## 2024-05-20 RX ORDER — INSULIN GLARGINE 100 [IU]/ML
10 INJECTION, SOLUTION SUBCUTANEOUS EVERY EVENING
Qty: 10 ML | Refills: 0 | Status: CANCELLED | OUTPATIENT
Start: 2024-05-20

## 2024-05-20 RX ORDER — DULAGLUTIDE 3 MG/.5ML
3 INJECTION, SOLUTION SUBCUTANEOUS
Qty: 6 ML | Refills: 1 | Status: SHIPPED | OUTPATIENT
Start: 2024-05-20 | End: 2024-05-20 | Stop reason: SDUPTHER

## 2024-05-20 RX ORDER — DULAGLUTIDE 3 MG/.5ML
3 INJECTION, SOLUTION SUBCUTANEOUS DAILY
Qty: 45 ML | Refills: 1 | Status: SHIPPED | OUTPATIENT
Start: 2024-05-20 | End: 2024-05-20 | Stop reason: SDUPTHER

## 2024-05-21 RX ORDER — DULAGLUTIDE 3 MG/.5ML
3 INJECTION, SOLUTION SUBCUTANEOUS
Qty: 6 ML | Refills: 1 | Status: SHIPPED | OUTPATIENT
Start: 2024-05-21 | End: 2024-08-19

## 2024-05-24 ENCOUNTER — TELEPHONE (OUTPATIENT)
Dept: MEDICAL GROUP | Facility: CLINIC | Age: 59
End: 2024-05-24
Payer: MEDICAID

## 2024-05-24 NOTE — TELEPHONE ENCOUNTER
Called patient left voicemail, checking in to make sure she filled her Lantus, also to inform her that she needs to get an updated A1C in order to send in PA for Trulicity

## 2024-05-28 DIAGNOSIS — E11.42 TYPE 2 DM WITH DIABETIC NEUROPATHY AFFECTING BOTH SIDES OF BODY (HCC): ICD-10-CM

## 2024-05-28 RX ORDER — INSULIN GLARGINE 100 [IU]/ML
INJECTION, SOLUTION SUBCUTANEOUS
Qty: 10 ML | Refills: 0 | Status: CANCELLED | OUTPATIENT
Start: 2024-05-28

## 2024-05-29 ENCOUNTER — TELEPHONE (OUTPATIENT)
Dept: MEDICAL GROUP | Facility: CLINIC | Age: 59
End: 2024-05-29
Payer: MEDICAID

## 2024-05-29 RX ORDER — INSULIN GLARGINE 100 [IU]/ML
10 INJECTION, SOLUTION SUBCUTANEOUS EVERY EVENING
Qty: 3 ML | Refills: 0 | Status: SHIPPED | OUTPATIENT
Start: 2024-05-29 | End: 2024-06-28

## 2024-05-29 NOTE — TELEPHONE ENCOUNTER
MEDICATION PRIOR AUTHORIZATION NEEDED:    1. Name of Medication: Trulicity 3MG/0.5ML pen-injectors    2. Requested By ([facility] of [facility]): [facility]     3. Is insurance on file current? Yes    4. What is the name & phone number of the 3rd party payor? Covermymeds

## 2024-06-11 ENCOUNTER — OFFICE VISIT (OUTPATIENT)
Dept: MEDICAL GROUP | Facility: CLINIC | Age: 59
End: 2024-06-11
Payer: MEDICAID

## 2024-06-11 VITALS
DIASTOLIC BLOOD PRESSURE: 85 MMHG | OXYGEN SATURATION: 95 % | BODY MASS INDEX: 29.52 KG/M2 | HEIGHT: 67 IN | TEMPERATURE: 97.1 F | WEIGHT: 188.1 LBS | HEART RATE: 87 BPM | SYSTOLIC BLOOD PRESSURE: 133 MMHG

## 2024-06-11 DIAGNOSIS — M79.89 PAIN AND SWELLING OF RIGHT LOWER EXTREMITY: ICD-10-CM

## 2024-06-11 DIAGNOSIS — E11.42 TYPE 2 DIABETES MELLITUS WITH DIABETIC POLYNEUROPATHY, WITHOUT LONG-TERM CURRENT USE OF INSULIN (HCC): ICD-10-CM

## 2024-06-11 DIAGNOSIS — E11.42 TYPE 2 DM WITH DIABETIC NEUROPATHY AFFECTING BOTH SIDES OF BODY (HCC): ICD-10-CM

## 2024-06-11 DIAGNOSIS — M79.604 PAIN AND SWELLING OF RIGHT LOWER EXTREMITY: ICD-10-CM

## 2024-06-11 LAB
HBA1C MFR BLD: 13.4 % (ref ?–5.8)
POCT INT CON NEG: NEGATIVE
POCT INT CON POS: POSITIVE

## 2024-06-11 PROCEDURE — 3075F SYST BP GE 130 - 139MM HG: CPT | Mod: GC

## 2024-06-11 PROCEDURE — 99214 OFFICE O/P EST MOD 30 MIN: CPT | Mod: GC

## 2024-06-11 PROCEDURE — 3079F DIAST BP 80-89 MM HG: CPT | Mod: GC

## 2024-06-11 PROCEDURE — 83036 HEMOGLOBIN GLYCOSYLATED A1C: CPT | Mod: GC

## 2024-06-11 RX ORDER — INSULIN ASPART 100 [IU]/ML
2 INJECTION, SOLUTION INTRAVENOUS; SUBCUTANEOUS
Qty: 2 ML | Refills: 0 | Status: SHIPPED | OUTPATIENT
Start: 2024-06-11 | End: 2024-07-11

## 2024-06-11 ASSESSMENT — PATIENT HEALTH QUESTIONNAIRE - PHQ9
SUM OF ALL RESPONSES TO PHQ QUESTIONS 1-9: 16
5. POOR APPETITE OR OVEREATING: 2 - MORE THAN HALF THE DAYS
CLINICAL INTERPRETATION OF PHQ2 SCORE: 1

## 2024-06-12 ENCOUNTER — HOSPITAL ENCOUNTER (OUTPATIENT)
Dept: RADIOLOGY | Facility: MEDICAL CENTER | Age: 59
End: 2024-06-12
Payer: MEDICAID

## 2024-06-12 DIAGNOSIS — M79.604 PAIN AND SWELLING OF RIGHT LOWER EXTREMITY: ICD-10-CM

## 2024-06-12 DIAGNOSIS — M79.89 PAIN AND SWELLING OF RIGHT LOWER EXTREMITY: ICD-10-CM

## 2024-06-12 PROCEDURE — 93971 EXTREMITY STUDY: CPT | Mod: RT

## 2024-06-12 NOTE — PROGRESS NOTES
[facility] MEDICINE     PATIENT ID:  NAME:  Chantale Greer  MRN:               [id number]  YOB: 1965    Date: 5:16 PM      Resident: Davonte Saenz M.D.    CC:  DM follow up      HPI: hCantale Greer is a 58 y.o. female who presented with her sister for diabetic follow-up.  Patient states that she has had a very hard time having her medications filled at her pharmacy.  She states in the past she was on Trulicity but has been having difficulties having this medication filled by her pharmacy.  She states this has been very frustrating for her and she feels her diabetic control has suffered because of this.  Patient has been using Lantus and also feels that she would like to restart short acting insulin.  She states she does not commonly check her blood sugars and is not sure that she would like to increase how frequently she does.  Otherwise the patient states that she is at her baseline state of health and denies any recent fever, chills, chest pain, shortness of breath, abdominal pain, changes to her voids or stools, trauma, injury, falls or any other concerns.    No problems updated.    REVIEW OF SYSTEMS:   Ten systems reviewed and were negative except as noted in the HPI.                PROBLEM LIST  Patient Active Problem List   Diagnosis    Dehydration    Type 2 DM with diabetic neuropathy affecting both sides of body (HCC)    Pain, dental    Hip pain    Depression    Type 2 diabetes mellitus with diabetic polyneuropathy, without long-term current use of insulin (HCC)    Back pain    HTN (hypertension)    Pain in both hands    Nausea    Dysuria    Screening for colon cancer    Asthma    Family history of psoriatic arthritis    Neuropathy    Acute pharyngitis        PAST SURGICAL HISTORY:  Past Surgical History:   Procedure Laterality Date    GYN SURGERY      c section, tubal    OTHER ABDOMINAL SURGERY      gastric sleeve       FAMILY HISTORY:  No family history on file.    SOCIAL HISTORY:    Social History     Tobacco Use    Smoking status: Former     Current packs/day: 0.00     Types: Cigarettes     Quit date: 2018     Years since quittin.4    Smokeless tobacco: Never   Substance Use Topics    Alcohol use: Never       ALLERGIES:  No Known Allergies    OUTPATIENT MEDICATIONS:    Current Outpatient Medications:     insulin aspart (NOVOLOG FLEXPEN) 100 UNIT/ML injection PEN, Inject 2 Units under the skin 3 times a day before meals for 30 days., Disp: 2 mL, Rfl: 0    insulin glargine (LANTUS SOLOSTAR) 100 UNIT/ML Solution Pen-injector injection, Inject 10 Units under the skin every evening for 30 days., Disp: 3 mL, Rfl: 0    Dulaglutide (TRULICITY) 3 MG/0.5ML Solution Pen-injector, Inject 3 mg under the skin every 7 days for 90 days., Disp: 6 mL, Rfl: 1    insulin glargine 100 UNIT/ML SC SOLN, Inject 10 Units under the skin every evening., Disp: 10 mL, Rfl: 0    cyclobenzaprine (FLEXERIL) 5 mg tablet, Take 1 Tablet by mouth 3 times a day as needed for Muscle Spasms or Moderate Pain., Disp: 30 Tablet, Rfl: 0    SUMAtriptan (IMITREX) 50 MG Tab, Take 1 Tablet by mouth one time as needed for Migraine for up to 1 dose., Disp: 10 Tablet, Rfl: 3    gabapentin (NEURONTIN) 600 MG tablet, Take 1 Tablet by mouth 3 times a day., Disp: 270 Tablet, Rfl: 3    lisinopril (PRINIVIL) 10 MG Tab, Take 1 Tablet by mouth every day., Disp: 90 Tablet, Rfl: 3    ibuprofen (MOTRIN) 800 MG Tab, Take 1 Tablet by mouth every 8 hours as needed for Moderate Pain., Disp: 50 Tablet, Rfl: 1    albuterol (PROAIR HFA) 108 (90 Base) MCG/ACT Aero Soln inhalation aerosol, Inhale 2 Puffs every four hours as needed for Shortness of Breath., Disp: 8.5 Each, Rfl: 2    simvastatin (ZOCOR) 20 MG Tab, Take 1 Tablet by mouth every evening., Disp: 90 Tablet, Rfl: 3    PHYSICAL EXAM:  Vitals:    24 1607   BP: 133/85   BP Location: Left arm   Patient Position: Sitting   BP Cuff Size: Adult   Pulse: 87   Temp: 36.2 °C (97.1 °F)   TempSrc:  "Temporal   SpO2: 95%   Weight: 85.3 kg (188 lb 1.6 oz)   Height: 1.702 m (5' 7\")       General: Pt resting in NAD, pleasant and cooperative   Skin:  Pink, warm and dry.  HEENT: NC/AT. EOMI.  Lungs:  Symmetrical.  CTAB, good air movement, no adventitious sounds  Cardiovascular:  S1/S2 RRR, no murmurs rubs or gallops  Abdomen:  Abdomen is soft, nontender, no distention, no masses  Extremities:  Full range of motion.  There is mild pretibial swelling to the right lower extremity, measurement of bilateral lower extremities is right lower extremity 41 cm, left lower extremity 40 cm at widest point, no Homans' sign, no cords, no tenderness to palpation of the right calf or posterior knee  CNS:  Muscle tone is normal. No gross focal neurologic deficits      ASSESSMENT/PLAN:   58 y.o. female who presents to clinic for diabetic follow-up.  Patient states she has not been able to fill her normal Trulicity medication due to supply issues and is concerned that her A1c has been elevated over the past few years.  She states she is taking Lantus currently between 10 to 20 units depending on what her average fasting blood glucose has been.  Patient states her preference would be to start a GLP-1 medication again and sliding scale insulin as she feels this has been beneficial for her in the past.  Counseled the patient that she will need to be more consistent in making her appointments to clinic so that we can effectively lower her blood glucose safely and improve her A1c.  Patient states that this sounds good to her and she is enthusiastic about improving her health. Counseled patient importance of blood glucose monitoring and she will attempt to be more consistent with her BG checks. Advised her to start with morning fasting BG, and preprandial checks and to send in BG logs for continued insulin adjustment. Patient states she has used SSI in the past with good results, counseled her that I would still like her to participate with " DM education as her glycemic control has not been well and we can always use a refresher. Given patient's level of frustration and that I will be out of clinic for the next 4 months with the start of fellowship will provide a referral to endocrinology for consistent follow up with a provider to focus on DM management. Will also send prior auth at this time for GLP-1 medication. Counseled patient about obtaining a  through her insurance to help expedite this process as she states he biggest concern is restarting a GLP-1 medication. Counseled patient and sister of signs and symptoms of hyperglycemia and hypoglycemia as well as red flag symptoms that should prompt a visit to the ED.     Patient states she is also concerned about some right foot swelling over the past few days.  Patient states she is very concerned after discussing this with her sister that she may have a blood clot.  On exam today the right lower extremity measures 41cm, Left lower extremity measures 40cm.  On examination there is no objective evidence of clot.  Counseled the patient that this may be reactive due to her history of peripheral neuropathy and tight fitting shoes in the clinic today.  Will order for outpatient ultrasound Doppler of the right lower extremity for further evaluation and rule out potential of DVT. Counseled patient that if any worsening symptoms or shortness of breath, chest pain, fever or any other concerns she should be seen emergently in the emergency department.    Will plan to follow-up in 2 weeks    Problem List Items Addressed This Visit       Type 2 DM with diabetic neuropathy affecting both sides of body (HCC)    Relevant Medications    insulin aspart (NOVOLOG FLEXPEN) 100 UNIT/ML injection PEN    Other Relevant Orders    POCT A1C (Completed)    Referral to Diabetic Education    Referral to Endocrinology    Type 2 diabetes mellitus with diabetic polyneuropathy, without long-term current use of insulin  ([facility])    Relevant Medications    insulin aspart (NOVOLOG FLEXPEN) 100 UNIT/ML injection PEN    Other Relevant Orders    Referral to Diabetic Education    Referral to Endocrinology     Other Visit Diagnoses       Pain and swelling of right lower extremity        Relevant Orders    US-EXTREMITY VENOUS LOWER UNILAT RIGHT (Completed)            Davonte Saenz M.D.  PGY-3  [facility]

## 2024-06-12 NOTE — PROGRESS NOTES
Rachel Saenz,    You requested to start a PA for this patient. May I ask what medication/s please?  Thank you      Best,  Simon

## 2024-06-13 ENCOUNTER — TELEPHONE (OUTPATIENT)
Dept: MEDICAL GROUP | Facility: CLINIC | Age: 59
End: 2024-06-13
Payer: MEDICAID

## 2024-06-13 DIAGNOSIS — E11.42 TYPE 2 DM WITH DIABETIC NEUROPATHY AFFECTING BOTH SIDES OF BODY (HCC): ICD-10-CM

## 2024-06-13 RX ORDER — SEMAGLUTIDE 0.68 MG/ML
0.5 INJECTION, SOLUTION SUBCUTANEOUS
Qty: 3 ML | Refills: 11 | Status: SHIPPED | OUTPATIENT
Start: 2024-06-13 | End: 2024-06-25 | Stop reason: SDUPTHER

## 2024-06-13 NOTE — TELEPHONE ENCOUNTER
----- Message from Davonte Saenz M.D. sent at 6/13/2024 12:39 PM PDT -----  Hi Simon,     Can we please call to let patient know that the US of her leg was negative and there was no sign of a clot.     Sincerely,   Jeb

## 2024-06-13 NOTE — PROGRESS NOTES
Patient requested Ozempic as pharmacy has been declining her prior prescriptions for GLP-1 medications.

## 2024-06-13 NOTE — TELEPHONE ENCOUNTER
Phone Number Called: [phone] (home)      Call outcome: Spoke to patient regarding message below.    Message: Pineda dthe patient and spoke to the patiet that the US in her leg were negative and there was no sign of a clot. OPAtient understand and extending her gratitude to the provider. Patient is looking forward on her next appointment with the provider.

## 2024-06-19 ENCOUNTER — HOSPITAL ENCOUNTER (OUTPATIENT)
Dept: LAB | Facility: MEDICAL CENTER | Age: 59
End: 2024-06-19
Attending: FAMILY MEDICINE
Payer: MEDICAID

## 2024-06-19 DIAGNOSIS — E11.42 TYPE 2 DM WITH DIABETIC NEUROPATHY AFFECTING BOTH SIDES OF BODY (HCC): ICD-10-CM

## 2024-06-19 DIAGNOSIS — R79.89 ABNORMAL LIVER FUNCTION TEST: ICD-10-CM

## 2024-06-19 DIAGNOSIS — M25.529 ARTHRALGIA OF ELBOW, UNSPECIFIED LATERALITY: ICD-10-CM

## 2024-06-19 DIAGNOSIS — F43.20 ADJUSTMENT DISORDER, UNSPECIFIED TYPE: ICD-10-CM

## 2024-06-19 DIAGNOSIS — I10 PRIMARY HYPERTENSION: ICD-10-CM

## 2024-06-19 DIAGNOSIS — F32.A DEPRESSION, UNSPECIFIED DEPRESSION TYPE: ICD-10-CM

## 2024-06-19 DIAGNOSIS — G62.9 NEUROPATHY: ICD-10-CM

## 2024-06-19 LAB
ALBUMIN SERPL BCP-MCNC: 4.2 G/DL (ref 3.2–4.9)
ALBUMIN/GLOB SERPL: 1.4 G/DL
ALP SERPL-CCNC: 130 U/L (ref 30–99)
ALT SERPL-CCNC: 30 U/L (ref 2–50)
ANION GAP SERPL CALC-SCNC: 12 MMOL/L (ref 7–16)
AST SERPL-CCNC: 23 U/L (ref 12–45)
BILIRUB SERPL-MCNC: 0.5 MG/DL (ref 0.1–1.5)
BUN SERPL-MCNC: 13 MG/DL (ref 8–22)
CALCIUM ALBUM COR SERPL-MCNC: 9.8 MG/DL (ref 8.5–10.5)
CALCIUM SERPL-MCNC: 10 MG/DL (ref 8.5–10.5)
CHLORIDE SERPL-SCNC: 101 MMOL/L (ref 96–112)
CHOLEST SERPL-MCNC: 182 MG/DL (ref 100–199)
CO2 SERPL-SCNC: 22 MMOL/L (ref 20–33)
CREAT SERPL-MCNC: 0.85 MG/DL (ref 0.5–1.4)
ERYTHROCYTE [DISTWIDTH] IN BLOOD BY AUTOMATED COUNT: 38.3 FL (ref 35.9–50)
EST. AVERAGE GLUCOSE BLD GHB EST-MCNC: 315 MG/DL
GFR SERPLBLD CREATININE-BSD FMLA CKD-EPI: 79 ML/MIN/1.73 M 2
GLOBULIN SER CALC-MCNC: 3 G/DL (ref 1.9–3.5)
GLUCOSE SERPL-MCNC: 224 MG/DL (ref 65–99)
HBA1C MFR BLD: 12.6 % (ref 4–5.6)
HBV SURFACE AG SER QL: NORMAL
HCT VFR BLD AUTO: 42.9 % (ref 37–47)
HCV AB SER QL: NORMAL
HDLC SERPL-MCNC: 72 MG/DL
HGB BLD-MCNC: 13.4 G/DL (ref 12–16)
IRON SATN MFR SERPL: 13 % (ref 15–55)
IRON SERPL-MCNC: 53 UG/DL (ref 40–170)
LDLC SERPL CALC-MCNC: 95 MG/DL
MCH RBC QN AUTO: 25.7 PG (ref 27–33)
MCHC RBC AUTO-ENTMCNC: 31.2 G/DL (ref 32.2–35.5)
MCV RBC AUTO: 82.3 FL (ref 81.4–97.8)
PLATELET # BLD AUTO: 231 K/UL (ref 164–446)
PMV BLD AUTO: 11.9 FL (ref 9–12.9)
POTASSIUM SERPL-SCNC: 5.9 MMOL/L (ref 3.6–5.5)
PROT SERPL-MCNC: 7.2 G/DL (ref 6–8.2)
RBC # BLD AUTO: 5.21 M/UL (ref 4.2–5.4)
RHEUMATOID FACT SER IA-ACNC: 10 IU/ML (ref 0–14)
SODIUM SERPL-SCNC: 135 MMOL/L (ref 135–145)
TIBC SERPL-MCNC: 412 UG/DL (ref 250–450)
TRIGL SERPL-MCNC: 74 MG/DL (ref 0–149)
TSH SERPL DL<=0.005 MIU/L-ACNC: 1.6 UIU/ML (ref 0.38–5.33)
UIBC SERPL-MCNC: 359 UG/DL (ref 110–370)
WBC # BLD AUTO: 9.5 K/UL (ref 4.8–10.8)

## 2024-06-19 PROCEDURE — 85027 COMPLETE CBC AUTOMATED: CPT

## 2024-06-19 PROCEDURE — 86803 HEPATITIS C AB TEST: CPT

## 2024-06-19 PROCEDURE — 84443 ASSAY THYROID STIM HORMONE: CPT

## 2024-06-19 PROCEDURE — 82103 ALPHA-1-ANTITRYPSIN TOTAL: CPT

## 2024-06-19 PROCEDURE — 83550 IRON BINDING TEST: CPT

## 2024-06-19 PROCEDURE — 87340 HEPATITIS B SURFACE AG IA: CPT

## 2024-06-19 PROCEDURE — 86431 RHEUMATOID FACTOR QUANT: CPT

## 2024-06-19 PROCEDURE — 83540 ASSAY OF IRON: CPT

## 2024-06-19 PROCEDURE — 80053 COMPREHEN METABOLIC PANEL: CPT

## 2024-06-19 PROCEDURE — 83036 HEMOGLOBIN GLYCOSYLATED A1C: CPT

## 2024-06-19 PROCEDURE — 80061 LIPID PANEL: CPT

## 2024-06-19 PROCEDURE — 86038 ANTINUCLEAR ANTIBODIES: CPT

## 2024-06-19 PROCEDURE — 86200 CCP ANTIBODY: CPT

## 2024-06-19 PROCEDURE — 82104 ALPHA-1-ANTITRYPSIN PHENO: CPT

## 2024-06-19 PROCEDURE — 36415 COLL VENOUS BLD VENIPUNCTURE: CPT

## 2024-06-21 ENCOUNTER — APPOINTMENT (OUTPATIENT)
Dept: MEDICAL GROUP | Facility: CLINIC | Age: 59
End: 2024-06-21
Payer: MEDICAID

## 2024-06-21 LAB
CCP IGA+IGG SERPL IA-ACNC: 7 UNITS (ref 0–19)
NUCLEAR IGG SER QL IA: NORMAL

## 2024-06-21 NOTE — PROGRESS NOTES
[facility]     PATIENT ID:  NAME:  Chantale Greer  MRN:               [id number]  YOB: 1965    Date: 8:31 AM      Resident: Davonte Saenz M.D.    CC:  ***      HPI: Chantale Greer is a 58 y.o. female who presented with ***    No problems updated.    REVIEW OF SYSTEMS:   Ten systems reviewed and were negative except as noted in the HPI.                PROBLEM LIST  Patient Active Problem List   Diagnosis    Dehydration    Type 2 DM with diabetic neuropathy affecting both sides of body (HCC)    Pain, dental    Hip pain    Depression    Type 2 diabetes mellitus with diabetic polyneuropathy, without long-term current use of insulin (HCC)    Back pain    HTN (hypertension)    Pain in both hands    Nausea    Dysuria    Screening for colon cancer    Asthma    Family history of psoriatic arthritis    Neuropathy    Acute pharyngitis        PAST SURGICAL HISTORY:  Past Surgical History:   Procedure Laterality Date    GYN SURGERY      c section, tubal    OTHER ABDOMINAL SURGERY      gastric sleeve       FAMILY HISTORY:  No family history on file.    SOCIAL HISTORY:   Social History     Tobacco Use    Smoking status: Former     Current packs/day: 0.00     Types: Cigarettes     Quit date:      Years since quittin.4    Smokeless tobacco: Never   Substance Use Topics    Alcohol use: Never       ALLERGIES:  No Known Allergies    OUTPATIENT MEDICATIONS:    Current Outpatient Medications:     Semaglutide,0.25 or 0.5MG/DOS, (OZEMPIC, 0.25 OR 0.5 MG/DOSE,) 2 MG/3ML Solution Pen-injector, Inject 0.5 mg under the skin every 7 days. Start 0.25mg SC once weekly for 4 weeks, then increase to 0.5mg SC once weekly for 4 weeks, Disp: 3 mL, Rfl: 11    insulin aspart (NOVOLOG FLEXPEN) 100 UNIT/ML injection PEN, Inject 2 Units under the skin 3 times a day before meals for 30 days., Disp: 2 mL, Rfl: 0    insulin glargine (LANTUS SOLOSTAR) 100 UNIT/ML Solution Pen-injector injection, Inject 10 Units under the  skin every evening for 30 days., Disp: 3 mL, Rfl: 0    Dulaglutide (TRULICITY) 3 MG/0.5ML Solution Pen-injector, Inject 3 mg under the skin every 7 days for 90 days., Disp: 6 mL, Rfl: 1    insulin glargine 100 UNIT/ML SC SOLN, Inject 10 Units under the skin every evening., Disp: 10 mL, Rfl: 0    cyclobenzaprine (FLEXERIL) 5 mg tablet, Take 1 Tablet by mouth 3 times a day as needed for Muscle Spasms or Moderate Pain., Disp: 30 Tablet, Rfl: 0    SUMAtriptan (IMITREX) 50 MG Tab, Take 1 Tablet by mouth one time as needed for Migraine for up to 1 dose., Disp: 10 Tablet, Rfl: 3    gabapentin (NEURONTIN) 600 MG tablet, Take 1 Tablet by mouth 3 times a day., Disp: 270 Tablet, Rfl: 3    lisinopril (PRINIVIL) 10 MG Tab, Take 1 Tablet by mouth every day., Disp: 90 Tablet, Rfl: 3    ibuprofen (MOTRIN) 800 MG Tab, Take 1 Tablet by mouth every 8 hours as needed for Moderate Pain., Disp: 50 Tablet, Rfl: 1    albuterol (PROAIR HFA) 108 (90 Base) MCG/ACT Aero Soln inhalation aerosol, Inhale 2 Puffs every four hours as needed for Shortness of Breath., Disp: 8.5 Each, Rfl: 2    simvastatin (ZOCOR) 20 MG Tab, Take 1 Tablet by mouth every evening., Disp: 90 Tablet, Rfl: 3    PHYSICAL EXAM:  There were no vitals filed for this visit.    General: Pt resting in NAD, cooperative   Skin:  Pink, warm and dry.  HEENT: NC/AT. EOMI.  Lungs:  Symmetrical.  CTAB, good air movement   Cardiovascular:  S1/S2 RRR   Abdomen:  Abdomen is soft, nontender  Extremities:  Full range of motion.  CNS:  Muscle tone is normal. No gross focal neurologic deficits      ASSESSMENT/PLAN:   58 y.o. female     Problem List Items Addressed This Visit    None      Davonte Saenz M.D.  PGY-3  [facility]

## 2024-06-22 LAB
A1AT PHENOTYP SERPL-IMP: NORMAL
A1AT SERPL-MCNC: 132 MG/DL (ref 90–200)

## 2024-06-25 ENCOUNTER — OFFICE VISIT (OUTPATIENT)
Dept: MEDICAL GROUP | Facility: CLINIC | Age: 59
End: 2024-06-25
Payer: MEDICAID

## 2024-06-25 VITALS
WEIGHT: 190.5 LBS | HEART RATE: 101 BPM | BODY MASS INDEX: 29.9 KG/M2 | SYSTOLIC BLOOD PRESSURE: 143 MMHG | TEMPERATURE: 96.5 F | DIASTOLIC BLOOD PRESSURE: 81 MMHG | HEIGHT: 67 IN | OXYGEN SATURATION: 96 %

## 2024-06-25 DIAGNOSIS — F33.41 RECURRENT MAJOR DEPRESSIVE DISORDER, IN PARTIAL REMISSION (HCC): ICD-10-CM

## 2024-06-25 DIAGNOSIS — Z79.4 TYPE 2 DIABETES MELLITUS WITH DIABETIC POLYNEUROPATHY, WITH LONG-TERM CURRENT USE OF INSULIN (HCC): ICD-10-CM

## 2024-06-25 DIAGNOSIS — Z12.31 ENCOUNTER FOR SCREENING MAMMOGRAM FOR MALIGNANT NEOPLASM OF BREAST: ICD-10-CM

## 2024-06-25 DIAGNOSIS — E11.42 TYPE 2 DIABETES MELLITUS WITH DIABETIC POLYNEUROPATHY, WITH LONG-TERM CURRENT USE OF INSULIN (HCC): ICD-10-CM

## 2024-06-25 DIAGNOSIS — Z12.12 ENCOUNTER FOR COLORECTAL CANCER SCREENING: ICD-10-CM

## 2024-06-25 DIAGNOSIS — K21.9 GASTROESOPHAGEAL REFLUX DISEASE, UNSPECIFIED WHETHER ESOPHAGITIS PRESENT: ICD-10-CM

## 2024-06-25 DIAGNOSIS — Z12.11 ENCOUNTER FOR COLORECTAL CANCER SCREENING: ICD-10-CM

## 2024-06-25 DIAGNOSIS — Z78.0 POSTMENOPAUSAL: ICD-10-CM

## 2024-06-25 RX ORDER — CITALOPRAM 40 MG/1
40 TABLET ORAL DAILY
Qty: 30 TABLET | Refills: 3 | Status: SHIPPED | OUTPATIENT
Start: 2024-06-25

## 2024-06-25 RX ORDER — SEMAGLUTIDE 0.68 MG/ML
0.5 INJECTION, SOLUTION SUBCUTANEOUS
Qty: 3 ML | Refills: 11 | Status: SHIPPED | OUTPATIENT
Start: 2024-06-25

## 2024-06-25 RX ORDER — BLOOD SUGAR DIAGNOSTIC
1 STRIP MISCELLANEOUS PRN
Qty: 300 EACH | Refills: 2 | Status: SHIPPED | OUTPATIENT
Start: 2024-06-25

## 2024-06-25 RX ORDER — OMEPRAZOLE 40 MG/1
40 CAPSULE, DELAYED RELEASE ORAL DAILY
Qty: 30 CAPSULE | Refills: 2 | Status: SHIPPED | OUTPATIENT
Start: 2024-06-25

## 2024-06-25 ASSESSMENT — FIBROSIS 4 INDEX: FIB4 SCORE: 1.05
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
## 2024-06-26 ENCOUNTER — TELEPHONE (OUTPATIENT)
Dept: MEDICAL GROUP | Facility: CLINIC | Age: 59
End: 2024-06-26
Payer: MEDICAID

## 2024-06-26 NOTE — TELEPHONE ENCOUNTER
I have called the insurance to get this done. I spoke to the representative of [place] [employer] and did a PA for ozempic over the phone. The ozempic was approve and the PA number is [id number]. I already called the patient's pharmacy also that the PA for ozempic has been approved by her insurance.

## 2024-06-26 NOTE — TELEPHONE ENCOUNTER
Phone Number Called: [phone].      Call outcome: Spoke to patient regarding message below.    Message: Called the patient and spoke to her saying that her ozempic has been approved by her insurance.

## 2024-06-27 ENCOUNTER — TELEPHONE (OUTPATIENT)
Dept: MEDICAL GROUP | Facility: CLINIC | Age: 59
End: 2024-06-27
Payer: MEDICAID

## 2024-06-27 NOTE — TELEPHONE ENCOUNTER
Rachel Saenz,    I have received a fax from [facility]'s pharmacy for this patient requesting a rx for       True plus lancets    True plus Lancing Device      I have sent the rx request already. Please advise. Thank you!

## 2024-06-27 NOTE — TELEPHONE ENCOUNTER
Received request via: Pharmacy    Was the patient seen in the last year in this department? Yes    Does the patient have an active prescription (recently filled or refills available) for medication(s) requested? No    Pharmacy Name: [facility]    Does the patient have group home Plus and need 100 day supply (blood pressure, diabetes and cholesterol meds only)? Patient does not have SCP

## 2024-06-27 NOTE — TELEPHONE ENCOUNTER
Phone Number Called: There are no phone numbers on file.      Call outcome: Left detailed message for patient. Informed to call back with any additional questions.    Message: Called patient but no answer. I left a voicemail sayong that Dr. Saenz wants her to schedule an Omt visit. If she can call be back to schedule her

## 2024-06-28 DIAGNOSIS — E11.42 TYPE 2 DM WITH DIABETIC NEUROPATHY AFFECTING BOTH SIDES OF BODY (HCC): ICD-10-CM

## 2024-06-28 RX ORDER — LANCETS 30 GAUGE
EACH MISCELLANEOUS
Qty: 300 EACH | Refills: 2 | Status: SHIPPED | OUTPATIENT
Start: 2024-06-28

## 2024-07-11 ENCOUNTER — HOSPITAL ENCOUNTER (OUTPATIENT)
Dept: RADIOLOGY | Facility: MEDICAL CENTER | Age: 59
End: 2024-07-11
Payer: MEDICAID

## 2024-07-18 DIAGNOSIS — G43.809 OTHER MIGRAINE WITHOUT STATUS MIGRAINOSUS, NOT INTRACTABLE: ICD-10-CM

## 2024-07-18 DIAGNOSIS — J45.909 ASTHMA, UNSPECIFIED ASTHMA SEVERITY, UNSPECIFIED WHETHER COMPLICATED, UNSPECIFIED WHETHER PERSISTENT: ICD-10-CM

## 2024-07-18 DIAGNOSIS — M25.559 HIP PAIN: ICD-10-CM

## 2024-07-18 RX ORDER — ALBUTEROL SULFATE 90 UG/1
2 AEROSOL, METERED RESPIRATORY (INHALATION) EVERY 4 HOURS PRN
Qty: 8.5 EACH | Refills: 2 | Status: SHIPPED | OUTPATIENT
Start: 2024-07-18

## 2024-07-19 RX ORDER — CYCLOBENZAPRINE HCL 5 MG
5 TABLET ORAL 3 TIMES DAILY PRN
Qty: 30 TABLET | Refills: 0 | Status: SHIPPED | OUTPATIENT
Start: 2024-07-19

## 2024-07-19 RX ORDER — IBUPROFEN 800 MG/1
800 TABLET ORAL EVERY 8 HOURS PRN
Qty: 50 TABLET | Refills: 1 | Status: SHIPPED | OUTPATIENT
Start: 2024-07-19

## 2024-07-19 RX ORDER — SUMATRIPTAN 50 MG/1
50 TABLET, FILM COATED ORAL
Qty: 10 TABLET | Refills: 3 | Status: SHIPPED | OUTPATIENT
Start: 2024-07-19

## 2024-07-24 ENCOUNTER — HOSPITAL ENCOUNTER (OUTPATIENT)
Dept: RADIOLOGY | Facility: MEDICAL CENTER | Age: 59
End: 2024-07-24
Payer: MEDICAID

## 2024-07-24 DIAGNOSIS — Z78.0 POSTMENOPAUSAL: ICD-10-CM

## 2024-07-24 DIAGNOSIS — Z12.31 ENCOUNTER FOR SCREENING MAMMOGRAM FOR MALIGNANT NEOPLASM OF BREAST: ICD-10-CM

## 2024-07-24 PROCEDURE — 77080 DXA BONE DENSITY AXIAL: CPT

## 2024-07-24 PROCEDURE — 77063 BREAST TOMOSYNTHESIS BI: CPT

## 2024-09-10 DIAGNOSIS — M25.559 HIP PAIN: ICD-10-CM

## 2024-09-10 NOTE — TELEPHONE ENCOUNTER
Received request via: Pharmacy    Was the patient seen in the last year in this department? Yes    Does the patient have an active prescription (recently filled or refills available) for medication(s) requested? No    Pharmacy Name: [id number] - [place], [place] - [address].

## 2024-09-11 RX ORDER — CYCLOBENZAPRINE HCL 5 MG
TABLET ORAL
Qty: 30 TABLET | Refills: 0 | Status: SHIPPED | OUTPATIENT
Start: 2024-09-11

## 2024-09-23 DIAGNOSIS — E11.42 TYPE 2 DIABETES MELLITUS WITH DIABETIC POLYNEUROPATHY, WITHOUT LONG-TERM CURRENT USE OF INSULIN (HCC): ICD-10-CM

## 2024-10-01 ENCOUNTER — APPOINTMENT (OUTPATIENT)
Dept: MEDICAL GROUP | Facility: CLINIC | Age: 59
End: 2024-10-01
Payer: MEDICAID

## 2024-10-01 VITALS
DIASTOLIC BLOOD PRESSURE: 80 MMHG | SYSTOLIC BLOOD PRESSURE: 122 MMHG | RESPIRATION RATE: 18 BRPM | OXYGEN SATURATION: 96 % | HEART RATE: 105 BPM | TEMPERATURE: 96.9 F | WEIGHT: 180 LBS | BODY MASS INDEX: 28.25 KG/M2 | HEIGHT: 67 IN
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
## 2024-10-01 DIAGNOSIS — E11.42 TYPE 2 DIABETES MELLITUS WITH DIABETIC POLYNEUROPATHY, WITHOUT LONG-TERM CURRENT USE OF INSULIN (HCC): ICD-10-CM

## 2024-10-01 DIAGNOSIS — M79.672 CHRONIC PAIN OF BOTH FEET: ICD-10-CM

## 2024-10-01 DIAGNOSIS — Z84.0 FAMILY HISTORY OF PSORIATIC ARTHRITIS: ICD-10-CM

## 2024-10-01 DIAGNOSIS — G89.29 CHRONIC RIGHT HIP PAIN: ICD-10-CM

## 2024-10-01 DIAGNOSIS — M79.671 CHRONIC PAIN OF BOTH FEET: ICD-10-CM

## 2024-10-01 DIAGNOSIS — G89.29 CHRONIC PAIN OF BOTH FEET: ICD-10-CM

## 2024-10-01 DIAGNOSIS — E11.42 TYPE 2 DM WITH DIABETIC NEUROPATHY AFFECTING BOTH SIDES OF BODY (HCC): ICD-10-CM

## 2024-10-01 DIAGNOSIS — M79.641 CHRONIC HAND PAIN, RIGHT: ICD-10-CM

## 2024-10-01 DIAGNOSIS — Z23 NEED FOR VACCINATION: ICD-10-CM

## 2024-10-01 DIAGNOSIS — M25.551 CHRONIC RIGHT HIP PAIN: ICD-10-CM

## 2024-10-01 DIAGNOSIS — M79.642 CHRONIC HAND PAIN, LEFT: ICD-10-CM

## 2024-10-01 DIAGNOSIS — G89.29 CHRONIC HAND PAIN, LEFT: ICD-10-CM

## 2024-10-01 DIAGNOSIS — G89.29 CHRONIC HAND PAIN, RIGHT: ICD-10-CM

## 2024-10-01 PROCEDURE — 99214 OFFICE O/P EST MOD 30 MIN: CPT | Mod: 25

## 2024-10-01 PROCEDURE — 90471 IMMUNIZATION ADMIN: CPT

## 2024-10-01 PROCEDURE — 90656 IIV3 VACC NO PRSV 0.5 ML IM: CPT

## 2024-10-01 ASSESSMENT — FIBROSIS 4 INDEX: FIB4 SCORE: 1.05

## 2024-10-03 ENCOUNTER — HOSPITAL ENCOUNTER (OUTPATIENT)
Dept: RADIOLOGY | Facility: MEDICAL CENTER | Age: 59
End: 2024-10-03
Payer: MEDICAID

## 2024-10-03 ENCOUNTER — HOSPITAL ENCOUNTER (OUTPATIENT)
Dept: LAB | Facility: MEDICAL CENTER | Age: 59
End: 2024-10-03
Payer: MEDICAID

## 2024-10-03 ENCOUNTER — PATIENT OUTREACH (OUTPATIENT)
Dept: HEALTH INFORMATION MANAGEMENT | Facility: OTHER | Age: 59
End: 2024-10-03
Payer: MEDICAID

## 2024-10-03 DIAGNOSIS — E11.42 TYPE 2 DIABETES MELLITUS WITH DIABETIC POLYNEUROPATHY, WITH LONG-TERM CURRENT USE OF INSULIN (HCC): ICD-10-CM

## 2024-10-03 DIAGNOSIS — M79.671 CHRONIC PAIN OF BOTH FEET: ICD-10-CM

## 2024-10-03 DIAGNOSIS — G89.29 CHRONIC HAND PAIN, RIGHT: ICD-10-CM

## 2024-10-03 DIAGNOSIS — G89.29 CHRONIC HAND PAIN, LEFT: ICD-10-CM

## 2024-10-03 DIAGNOSIS — G89.29 CHRONIC RIGHT HIP PAIN: ICD-10-CM

## 2024-10-03 DIAGNOSIS — M79.672 CHRONIC PAIN OF BOTH FEET: ICD-10-CM

## 2024-10-03 DIAGNOSIS — M79.641 CHRONIC HAND PAIN, RIGHT: ICD-10-CM

## 2024-10-03 DIAGNOSIS — Z79.4 TYPE 2 DIABETES MELLITUS WITH DIABETIC POLYNEUROPATHY, WITH LONG-TERM CURRENT USE OF INSULIN (HCC): ICD-10-CM

## 2024-10-03 DIAGNOSIS — M79.642 CHRONIC HAND PAIN, LEFT: ICD-10-CM

## 2024-10-03 DIAGNOSIS — G89.29 CHRONIC PAIN OF BOTH FEET: ICD-10-CM
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
## 2024-10-03 DIAGNOSIS — Z84.0 FAMILY HISTORY OF PSORIATIC ARTHRITIS: ICD-10-CM

## 2024-10-03 DIAGNOSIS — M25.551 CHRONIC RIGHT HIP PAIN: ICD-10-CM

## 2024-10-03 LAB
CRP SERPL HS-MCNC: <0.3 MG/DL (ref 0–0.75)
ERYTHROCYTE [SEDIMENTATION RATE] IN BLOOD BY WESTERGREN METHOD: 16 MM/HOUR (ref 0–25)

## 2024-10-03 PROCEDURE — 86812 HLA TYPING A B OR C: CPT

## 2024-10-03 PROCEDURE — 86140 C-REACTIVE PROTEIN: CPT

## 2024-10-03 PROCEDURE — 85652 RBC SED RATE AUTOMATED: CPT

## 2024-10-03 PROCEDURE — 36415 COLL VENOUS BLD VENIPUNCTURE: CPT

## 2024-10-03 PROCEDURE — 77077 JOINT SURVEY SINGLE VIEW: CPT

## 2024-10-03 PROCEDURE — 73502 X-RAY EXAM HIP UNI 2-3 VIEWS: CPT | Mod: RT

## 2024-10-03 PROCEDURE — 86038 ANTINUCLEAR ANTIBODIES: CPT

## 2024-10-05 LAB — HLA-B27 QL FC: NEGATIVE

## 2024-10-06 LAB — NUCLEAR IGG SER QL IA: NORMAL

## 2024-10-07 ENCOUNTER — TELEPHONE (OUTPATIENT)
Dept: MEDICAL GROUP | Facility: CLINIC | Age: 59
End: 2024-10-07
Payer: MEDICAID

## 2024-10-08 ENCOUNTER — OFFICE VISIT (OUTPATIENT)
Dept: MEDICAL GROUP | Facility: CLINIC | Age: 59
End: 2024-10-08
Payer: MEDICAID

## 2024-10-08 VITALS
BODY MASS INDEX: 27.78 KG/M2 | TEMPERATURE: 98.1 F | HEIGHT: 67 IN | WEIGHT: 177 LBS | HEART RATE: 84 BPM | DIASTOLIC BLOOD PRESSURE: 78 MMHG | RESPIRATION RATE: 20 BRPM | SYSTOLIC BLOOD PRESSURE: 134 MMHG | OXYGEN SATURATION: 96 %

## 2024-10-08 DIAGNOSIS — L40.50 PSORIATIC ARTHRITIS (HCC): ICD-10-CM

## 2024-10-08 DIAGNOSIS — M77.12 LEFT LATERAL EPICONDYLITIS: ICD-10-CM

## 2024-10-08 DIAGNOSIS — E11.42 TYPE 2 DIABETES MELLITUS WITH DIABETIC POLYNEUROPATHY, WITHOUT LONG-TERM CURRENT USE OF INSULIN (HCC): ICD-10-CM

## 2024-10-08 DIAGNOSIS — M16.11 PRIMARY OSTEOARTHRITIS OF RIGHT HIP: ICD-10-CM

## 2024-10-08 PROCEDURE — 3075F SYST BP GE 130 - 139MM HG: CPT

## 2024-10-08 PROCEDURE — 3078F DIAST BP <80 MM HG: CPT

## 2024-10-08 PROCEDURE — 99214 OFFICE O/P EST MOD 30 MIN: CPT

## 2024-10-08 ASSESSMENT — FIBROSIS 4 INDEX: FIB4 SCORE: 1.05

## 2024-10-08 ASSESSMENT — PAIN SCALES - GENERAL: PAINLEVEL: 8=MODERATE-SEVERE PAIN

## 2024-10-09 ENCOUNTER — TELEPHONE (OUTPATIENT)
Dept: HEALTH INFORMATION MANAGEMENT | Facility: OTHER | Age: 59
End: 2024-10-09
Payer: MEDICAID

## 2024-10-15 ENCOUNTER — OFFICE VISIT (OUTPATIENT)
Dept: MEDICAL GROUP | Facility: OTHER | Age: 59
End: 2024-10-15
Payer: MEDICAID

## 2024-10-15 VITALS
SYSTOLIC BLOOD PRESSURE: 140 MMHG | HEART RATE: 97 BPM | RESPIRATION RATE: 16 BRPM | BODY MASS INDEX: 27.81 KG/M2 | DIASTOLIC BLOOD PRESSURE: 80 MMHG | HEIGHT: 67 IN | OXYGEN SATURATION: 97 % | WEIGHT: 177.2 LBS | TEMPERATURE: 96.4 F

## 2024-10-15 DIAGNOSIS — M25.551 GREATER TROCHANTERIC PAIN SYNDROME OF RIGHT LOWER EXTREMITY: ICD-10-CM

## 2024-10-15 PROCEDURE — 3079F DIAST BP 80-89 MM HG: CPT | Performed by: STUDENT IN AN ORGANIZED HEALTH CARE EDUCATION/TRAINING PROGRAM

## 2024-10-15 PROCEDURE — 3077F SYST BP >= 140 MM HG: CPT | Performed by: STUDENT IN AN ORGANIZED HEALTH CARE EDUCATION/TRAINING PROGRAM

## 2024-10-15 PROCEDURE — 20610 DRAIN/INJ JOINT/BURSA W/O US: CPT | Mod: GC,RT | Performed by: STUDENT IN AN ORGANIZED HEALTH CARE EDUCATION/TRAINING PROGRAM

## 2024-10-15 RX ORDER — TRIAMCINOLONE ACETONIDE 40 MG/ML
40 INJECTION, SUSPENSION INTRA-ARTICULAR; INTRAMUSCULAR ONCE
Status: COMPLETED | OUTPATIENT
Start: 2024-10-15 | End: 2024-10-15

## 2024-10-15 RX ADMIN — TRIAMCINOLONE ACETONIDE 40 MG: 40 INJECTION, SUSPENSION INTRA-ARTICULAR; INTRAMUSCULAR at 11:40

## 2024-10-15 ASSESSMENT — FIBROSIS 4 INDEX: FIB4 SCORE: 1.05

## 2024-10-18 ENCOUNTER — E-CONSULT (OUTPATIENT)
Dept: RHEUMATOLOGY | Facility: MEDICAL CENTER | Age: 59
End: 2024-10-18
Payer: MEDICAID

## 2024-10-18 DIAGNOSIS — M16.0 PRIMARY OSTEOARTHRITIS OF BOTH HIPS: ICD-10-CM

## 2024-10-18 DIAGNOSIS — M77.12 LEFT LATERAL EPICONDYLITIS: ICD-10-CM

## 2024-10-18 DIAGNOSIS — L40.52 PSORIATIC ARTHRITIS, DESTRUCTIVE TYPE (HCC): ICD-10-CM

## 2024-10-18 DIAGNOSIS — Z71.9 ENCOUNTER FOR CONSULTATION: ICD-10-CM

## 2024-11-01 DIAGNOSIS — M25.559 HIP PAIN: ICD-10-CM

## 2024-11-01 DIAGNOSIS — L40.50 PSORIATIC ARTHRITIS (HCC): ICD-10-CM

## 2024-11-01 DIAGNOSIS — I10 PRIMARY HYPERTENSION: ICD-10-CM

## 2024-11-01 DIAGNOSIS — E11.42 TYPE 2 DIABETES MELLITUS WITH DIABETIC POLYNEUROPATHY, WITHOUT LONG-TERM CURRENT USE OF INSULIN (HCC): ICD-10-CM

## 2024-11-01 DIAGNOSIS — E11.42 TYPE 2 DIABETES MELLITUS WITH DIABETIC POLYNEUROPATHY, WITH LONG-TERM CURRENT USE OF INSULIN (HCC): ICD-10-CM

## 2024-11-01 DIAGNOSIS — G62.9 NEUROPATHY: ICD-10-CM

## 2024-11-01 DIAGNOSIS — G43.809 OTHER MIGRAINE WITHOUT STATUS MIGRAINOSUS, NOT INTRACTABLE: ICD-10-CM

## 2024-11-01 DIAGNOSIS — Z79.4 TYPE 2 DIABETES MELLITUS WITH DIABETIC POLYNEUROPATHY, WITH LONG-TERM CURRENT USE OF INSULIN (HCC): ICD-10-CM

## 2024-11-01 DIAGNOSIS — K21.9 GASTROESOPHAGEAL REFLUX DISEASE, UNSPECIFIED WHETHER ESOPHAGITIS PRESENT: ICD-10-CM

## 2024-11-01 RX ORDER — SIMVASTATIN 20 MG
20 TABLET ORAL EVERY EVENING
Qty: 90 TABLET | Refills: 3 | Status: SHIPPED | OUTPATIENT
Start: 2024-11-01

## 2024-11-01 NOTE — TELEPHONE ENCOUNTER
Received request via: Pharmacy    Was the patient seen in the last year in this department? Yes    Does the patient have an active prescription (recently filled or refills available) for medication(s) requested? No    Pharmacy Name:   [id number] - [place], [place] - [address].       Does the patient have group home Plus and need 100-day supply? (This applies to ALL medications) Patient does not have SCP

## 2024-11-04 RX ORDER — SEMAGLUTIDE 0.68 MG/ML
0.5 INJECTION, SOLUTION SUBCUTANEOUS
Qty: 3 ML | Refills: 11 | Status: SHIPPED | OUTPATIENT
Start: 2024-11-04

## 2024-11-04 RX ORDER — OMEPRAZOLE 40 MG/1
40 CAPSULE, DELAYED RELEASE ORAL DAILY
Qty: 30 CAPSULE | Refills: 2 | Status: SHIPPED | OUTPATIENT
Start: 2024-11-04

## 2024-11-04 RX ORDER — CITALOPRAM HYDROBROMIDE 40 MG/1
40 TABLET ORAL DAILY
Qty: 30 TABLET | Refills: 3 | Status: SHIPPED | OUTPATIENT
Start: 2024-11-04

## 2024-11-08 ENCOUNTER — TELEPHONE (OUTPATIENT)
Dept: MEDICAL GROUP | Facility: CLINIC | Age: 59
End: 2024-11-08
Payer: MEDICAID

## 2024-11-08 DIAGNOSIS — L40.50 PSORIATIC ARTHRITIS (HCC): ICD-10-CM

## 2024-11-08 DIAGNOSIS — Z84.0 FAMILY HISTORY OF PSORIATIC ARTHRITIS: ICD-10-CM

## 2024-11-15 DIAGNOSIS — F32.A DEPRESSION, UNSPECIFIED DEPRESSION TYPE: ICD-10-CM

## 2024-11-24 DIAGNOSIS — M25.559 HIP PAIN: ICD-10-CM

## 2024-11-24 DIAGNOSIS — E11.42 TYPE 2 DM WITH DIABETIC NEUROPATHY AFFECTING BOTH SIDES OF BODY (HCC): ICD-10-CM

## 2024-11-24 DIAGNOSIS — G89.29 CHRONIC RIGHT HIP PAIN: ICD-10-CM

## 2024-11-24 DIAGNOSIS — M25.551 CHRONIC RIGHT HIP PAIN: ICD-10-CM

## 2024-11-24 DIAGNOSIS — Z84.0 FAMILY HISTORY OF PSORIATIC ARTHRITIS: ICD-10-CM

## 2024-11-24 RX ORDER — INSULIN GLARGINE 100 [IU]/ML
10 INJECTION, SOLUTION SUBCUTANEOUS EVERY EVENING
Qty: 10 ML | Refills: 0 | Status: SHIPPED | OUTPATIENT
Start: 2024-11-24

## 2024-11-24 RX ORDER — CYCLOBENZAPRINE HCL 5 MG
5 TABLET ORAL 3 TIMES DAILY PRN
Qty: 30 TABLET | Refills: 0 | Status: SHIPPED | OUTPATIENT
Start: 2024-11-24

## 2024-11-26 NOTE — TELEPHONE ENCOUNTER
Received request via: Pharmacy    Was the patient seen in the last year in this department? Yes    Does the patient have an active prescription (recently filled or refills available) for medication(s) requested? No    Pharmacy Name: [facility]

## 2024-12-03 RX ORDER — SUMATRIPTAN 50 MG/1
50 TABLET, FILM COATED ORAL
Qty: 10 TABLET | Refills: 3 | Status: SHIPPED | OUTPATIENT
Start: 2024-12-03

## 2024-12-03 RX ORDER — CYCLOBENZAPRINE HCL 5 MG
5-10 TABLET ORAL 3 TIMES DAILY PRN
Qty: 30 TABLET | Refills: 0 | Status: SHIPPED | OUTPATIENT
Start: 2024-12-03 | End: 2025-01-02

## 2024-12-03 RX ORDER — LISINOPRIL 10 MG/1
10 TABLET ORAL DAILY
Qty: 90 TABLET | Refills: 3 | Status: SHIPPED | OUTPATIENT
Start: 2024-12-03

## 2024-12-03 RX ORDER — IBUPROFEN 800 MG/1
800 TABLET, FILM COATED ORAL EVERY 8 HOURS PRN
Qty: 50 TABLET | Refills: 1 | Status: SHIPPED | OUTPATIENT
Start: 2024-12-03

## 2024-12-03 RX ORDER — GABAPENTIN 600 MG/1
600 TABLET ORAL 3 TIMES DAILY
Qty: 270 TABLET | Refills: 3 | Status: SHIPPED | OUTPATIENT
Start: 2024-12-03

## 2024-12-24 DIAGNOSIS — E86.0 DEHYDRATION: ICD-10-CM

## 2025-01-09 ENCOUNTER — OFFICE VISIT (OUTPATIENT)
Dept: BEHAVIORAL HEALTH | Facility: CLINIC | Age: 60
End: 2025-01-09
Payer: MEDICAID

## 2025-01-09 DIAGNOSIS — F33.1 MAJOR DEPRESSIVE DISORDER, RECURRENT EPISODE, MODERATE (HCC): ICD-10-CM

## 2025-01-09 DIAGNOSIS — F51.01 PRIMARY INSOMNIA: ICD-10-CM

## 2025-01-09 DIAGNOSIS — F41.1 GENERALIZED ANXIETY DISORDER: ICD-10-CM

## 2025-01-09 PROCEDURE — 90792 PSYCH DIAG EVAL W/MED SRVCS: CPT | Performed by: PSYCHIATRY & NEUROLOGY

## 2025-01-09 RX ORDER — HYDROXYZINE HYDROCHLORIDE 25 MG/1
25 TABLET, FILM COATED ORAL
Qty: 30 TABLET | Refills: 0 | Status: SHIPPED | OUTPATIENT
Start: 2025-01-09 | End: 2025-02-08

## 2025-01-09 RX ORDER — DOXEPIN HYDROCHLORIDE 25 MG/1
25 CAPSULE ORAL NIGHTLY
Qty: 30 CAPSULE | Refills: 0 | Status: SHIPPED | OUTPATIENT
Start: 2025-01-09 | End: 2025-02-08
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
## 2025-01-14 ENCOUNTER — NON-PROVIDER VISIT (OUTPATIENT)
Dept: VASCULAR LAB | Facility: MEDICAL CENTER | Age: 60
End: 2025-01-14
Attending: INTERNAL MEDICINE
Payer: MEDICAID

## 2025-01-14 DIAGNOSIS — G43.809 OTHER MIGRAINE WITHOUT STATUS MIGRAINOSUS, NOT INTRACTABLE: ICD-10-CM

## 2025-01-14 DIAGNOSIS — G89.29 CHRONIC RIGHT HIP PAIN: ICD-10-CM

## 2025-01-14 DIAGNOSIS — E11.42 TYPE 2 DM WITH DIABETIC NEUROPATHY AFFECTING BOTH SIDES OF BODY (HCC): ICD-10-CM

## 2025-01-14 DIAGNOSIS — M25.559 HIP PAIN: ICD-10-CM

## 2025-01-14 DIAGNOSIS — M25.551 CHRONIC RIGHT HIP PAIN: ICD-10-CM

## 2025-01-14 DIAGNOSIS — Z84.0 FAMILY HISTORY OF PSORIATIC ARTHRITIS: ICD-10-CM

## 2025-01-14 LAB
HBA1C MFR BLD: 10.5 % (ref ?–5.8)
POCT INT CON NEG: NEGATIVE
POCT INT CON POS: POSITIVE

## 2025-01-14 PROCEDURE — 99213 OFFICE O/P EST LOW 20 MIN: CPT | Performed by: PHARMACIST

## 2025-01-14 PROCEDURE — 83036 HEMOGLOBIN GLYCOSYLATED A1C: CPT

## 2025-01-14 RX ORDER — SEMAGLUTIDE 1.34 MG/ML
1 INJECTION, SOLUTION SUBCUTANEOUS
Qty: 3 ML | Refills: 5 | Status: SHIPPED | OUTPATIENT
Start: 2025-01-14

## 2025-01-14 RX ORDER — EMPAGLIFLOZIN 25 MG/1
25 TABLET, FILM COATED ORAL DAILY
Qty: 30 TABLET | Refills: 5 | Status: SHIPPED | OUTPATIENT
Start: 2025-01-14 | End: 2026-02-18

## 2025-01-14 RX ORDER — GLUCOSAMINE HCL/CHONDROITIN SU 500-400 MG
CAPSULE ORAL
Qty: 100 EACH | Refills: 3 | Status: SHIPPED | OUTPATIENT
Start: 2025-01-14

## 2025-01-14 RX ORDER — LANCETS 30 GAUGE
EACH MISCELLANEOUS
Qty: 100 EACH | Refills: 3 | Status: SHIPPED | OUTPATIENT
Start: 2025-01-14

## 2025-01-14 NOTE — PROGRESS NOTES
Patient Consult Note    Primary care physician: Davonte Saenz M.D.    Reason for consult: Management of Uncontrolled Type 2 Diabetes    Date Referral Placed: 9/23/24    HPI:  Chantale Greer is a 59 y.o. old patient who comes in today for evaluation of above stated problem.    Allergies  Patient has no known allergies.    Current Diabetes Medication Regimen  GLP-1 or GLP-1/GIP Agent: semaglutide (Ozempic) 0.5 mg weekly - has been on for ~ 4 months    Basal Insulin: glargine 15 units QHS - started 2 weeks ago    Previous Diabetes Medications and Reason for Discontinuation  Novolin OTC - Dc'd in favor of GLP1 & long acting insulin  Trulicity - Procurement issues  Metformin IR & ER - vomiting & diarrhea    Potential Barriers to Care:  Adherence: reports missed doses of insulin 1-2x/week  Side effects: none  Affordability: Pt w/ Medicaid    SMBG  Pt has home glucometer and proper testing technique - yes, but pt requests Rx for updated supplies today. Declines Rx for CGM today in clinic. She's amenable to test thrice weekly moving forward.    Hypoglycemia  Hypoglycemia awareness: Yes  Nocturnal hypoglycemia: None  Hypoglycemia:  None  Pt's treatment of Hypoglycemia  Discussed 15:15 Rule    Lifestyle  Current Exercise - Pt w/ hx of hip fracture so this is limiting to her. Tries to walk 3-4x/week for 30-45 min.    Dietary - Eats one meal daily. Lives in a studio apt w/ just a microwave.  Dinner - [place], chilly dog, spaghetti, salad, pizza  Snacks - Candy, chips, crackers, cheese  Dessert - Candy bar  Drinks - Regular soda (1-2 per day & unwilling to DC), water    Labs  Lab Results   Component Value Date/Time    HBA1C 10.5 (A) 01/14/2025 12:00 AM    HBA1C 12.6 (H) 06/19/2024 01:35 PM    HBA1C 13.4 (A) 06/01/2024 04:00 PM      Lab Results   Component Value Date/Time    SODIUM 135 06/19/2024 01:34 PM    POTASSIUM 5.9 (H) 06/19/2024 01:34 PM    CHLORIDE 101 06/19/2024 01:34 PM    CO2 22 06/19/2024 01:34 PM    GLUCOSE  224 (H) 06/19/2024 01:34 PM    BUN 13 06/19/2024 01:34 PM    CREATININE 0.85 06/19/2024 01:34 PM    BUNCREATRAT 12.0 04/21/2022 09:34 PM     Lab Results   Component Value Date/Time    ALKPHOSPHAT 130 (H) 06/19/2024 01:34 PM    ASTSGOT 23 06/19/2024 01:34 PM    ALTSGPT 30 06/19/2024 01:34 PM    TBILIRUBIN 0.5 06/19/2024 01:34 PM    ALBUMIN 4.2 06/19/2024 01:34 PM      Lab Results   Component Value Date/Time    CHOLSTRLTOT 182 06/19/2024 01:35 PM    LDL 95 06/19/2024 01:35 PM    HDL 72 06/19/2024 01:35 PM    TRIGLYCERIDE 74 06/19/2024 01:35 PM       Lab Results   Component Value Date/Time    MALBCRT see below 05/11/2018 03:08 PM    MICROALBUR <0.7 05/11/2018 03:08 PM       Physical Examination:  Vital signs: There were no vitals taken for this visit. There is no height or weight on file to calculate BMI.    Assessment and Plan:    1. DM2  Basic physiology of DMII was explained to patient as well as microvascular/macrovascular complications. The importance of increasing physical activity to improve diabetes control was discussed with the patient. Patient was also educated on changing diet and making better choices to help control blood sugar.  Discussed Goals: FBG 80 - 130, 2hPP < 180, a1c < 7.0%  Last a1c drawn today was 10.5%, which is not at goal and has improved since the previous reading (12.6% on 6/19/2024).  Pt not currently testing her BG - states she didn't see the point. After d/w pt the importance of monitoring she was amenable to start doing so at least thrice weekly.  Pt w/ hx of gastric sleeve 8-9 yrs ago. Lost 120 lbs since this time.  Pt lives in studio apt - limited means to cook meals. Discussed alternatives and low carb/sugar options as her diet is very glucose/carb dense.  Pt expressed interest in establishing w/ new PCP - placed referral accordingly today.    - Medication changes:  INCREASE Ozempic up to 1 mg SQ Q7D   START Jardiance 25 mg once daily  Counseled pt regarding MOA, SE, and  administration  Start w/ 12.5 mg x 14 days then increase to 25 mg thereafter if no issues or noted SE  - Continue:  Glargine 15 units once daily     - Lifestyle changes:  Diet: Maximize lean proteins and veggies. Cut out/down on carbs. Avoid simple sugars.   Exercise: Increase as tolerated. Aim for at least 150 min/week of anything aerobic.    - Preventative management:  REC DM Score: 3  Care gaps addressed:   A1c is above 8%: Optimized DM medications/management  Microalbumin:creatine is due: UACR ordered  Retinal exam due: Reminded patient to complete retinal exam - states it's scheduled for next week.  Care gaps updated in Health Maintenance    Follow Up:  1 months    Escobar Messina, PharmD, BCACP    CC:   Davonte Saenz M.D.

## 2025-01-14 NOTE — PATIENT INSTRUCTIONS
Start Jardiance - take 1/2 tab once daily x 14 days then increase up to a full tablet thereafter    INCREASE Ozempic up to 1 mg weekly

## 2025-01-15 DIAGNOSIS — G43.809 OTHER MIGRAINE WITHOUT STATUS MIGRAINOSUS, NOT INTRACTABLE: ICD-10-CM

## 2025-01-15 DIAGNOSIS — J45.909 ASTHMA, UNSPECIFIED ASTHMA SEVERITY, UNSPECIFIED WHETHER COMPLICATED, UNSPECIFIED WHETHER PERSISTENT: ICD-10-CM

## 2025-01-15 RX ORDER — SUMATRIPTAN 50 MG/1
50 TABLET, FILM COATED ORAL
Qty: 10 TABLET | Refills: 3 | Status: SHIPPED | OUTPATIENT
Start: 2025-01-15

## 2025-01-15 RX ORDER — ALBUTEROL SULFATE 90 UG/1
2 INHALANT RESPIRATORY (INHALATION) EVERY 4 HOURS PRN
Qty: 8.5 EACH | Refills: 2 | Status: SHIPPED | OUTPATIENT
Start: 2025-01-15

## 2025-01-15 RX ORDER — CYCLOBENZAPRINE HCL 5 MG
TABLET ORAL
Qty: 30 TABLET | Refills: 0 | Status: SHIPPED | OUTPATIENT
Start: 2025-01-15

## 2025-01-15 NOTE — TELEPHONE ENCOUNTER
Received request via: Pharmacy    Was the patient seen in the last year in this department? Yes    Does the patient have an active prescription (recently filled or refills available) for medication(s) requested? No    Pharmacy Name: [id number] - [place], [place] - [address].

## 2025-01-15 NOTE — TELEPHONE ENCOUNTER
Received request via: Pharmacy    Was the patient seen in the last year in this department? Yes    Does the patient have an active prescription (recently filled or refills available) for medication(s) requested? No    [facility] Name: [facility]     Does the patient have [facility] Plus and need 100-day supply? (This applies to ALL medications) Patient does not have SCP

## 2025-01-15 NOTE — TELEPHONE ENCOUNTER
Received request via: Patient    Was the patient seen in the last year in this department? Yes    Does the patient have an active prescription (recently filled or refills available) for medication(s) requested? No    Pharmacy Name: [id number] - [place], [place] - [address]

## 2025-01-16 RX ORDER — SUMATRIPTAN 50 MG/1
50 TABLET, FILM COATED ORAL
Qty: 13 TABLET | Refills: 0 | Status: SHIPPED | OUTPATIENT
Start: 2025-01-16

## 2025-01-30 ENCOUNTER — OFFICE VISIT (OUTPATIENT)
Dept: BEHAVIORAL HEALTH | Facility: CLINIC | Age: 60
End: 2025-01-30
Payer: MEDICAID

## 2025-01-30 DIAGNOSIS — F98.8 ATTENTION DEFICIT DISORDER WITHOUT HYPERACTIVITY: ICD-10-CM

## 2025-01-30 DIAGNOSIS — F33.1 MAJOR DEPRESSIVE DISORDER, RECURRENT EPISODE, MODERATE (HCC): ICD-10-CM

## 2025-01-30 DIAGNOSIS — F41.1 GENERALIZED ANXIETY DISORDER: ICD-10-CM

## 2025-01-30 RX ORDER — DEXTROAMPHETAMINE SACCHARATE, AMPHETAMINE ASPARTATE, DEXTROAMPHETAMINE SULFATE AND AMPHETAMINE SULFATE 2.5; 2.5; 2.5; 2.5 MG/1; MG/1; MG/1; MG/1
10 TABLET ORAL 3 TIMES DAILY
Qty: 90 TABLET | Refills: 0 | Status: SHIPPED | OUTPATIENT
Start: 2025-01-30 | End: 2025-03-01

## 2025-01-30 RX ORDER — DOXEPIN HYDROCHLORIDE 100 MG/1
100 CAPSULE ORAL NIGHTLY
Qty: 30 CAPSULE | Refills: 0 | Status: SHIPPED | OUTPATIENT
Start: 2025-01-30 | End: 2025-03-01

## 2025-01-30 NOTE — PROGRESS NOTES
[facility]   Follow Up Assessment     This provider informed the patient their medical records are totally confidential except for the use by other providers involved in their care, or if the patient signs a release, or to report instances of child or elder abuse, or if it is determined they are an immediate risk to harm themselves or others.    Name: Chantale Greer  MRN: [id number]  : 1965  Age: 59 y.o.  Date of assessment: 2025  PCP: Davonte Saenz M.D.      Subjective:  Chart reviewed prior to seeing her in my office.  She is upset because a 75-year-old male neighbor  while having cardiac surgery on .   She was seen most recently on .  Doxepin 25 mg at bedtime is helping her anxiety and depression but she would like to increase to doxepin 100 mg taking 1/2 initially.  We again reviewed her symptoms of ADD and dose options for Adderall.  She cannot recall the dosage of Adderall she used previously tried.    Objective:  She is alert, oriented, and cooperative.  Relatedness is good.  Grooming is good.  Speech is normal rate.  Less anxious.  Memory is good.  Insight and judgment are fairly good.  No indication of psychotic thinking.    Current Risk:       Suicidal: Not suicidal       Homicidal: Not homicidal       Self-Harm: No plan to harm self       Relapse: (Low/Moderate/High): Moderate       Crisis Safety Plan Reviewed: Discussed with patient    Diagnosis:   Major depressive disorder, recurrent  Generalized anxiety disorder with panic attacks  ADD  Insomnia    Treatment Plan:  The current treatment plan consists of a follow-up visit in 1 month and then quarterly psychiatric sessions designed to evaluate her depression, anxiety, ADD, and insomnia.    Duration will be for a minimum of 12 months and will be reviewed at each visit.    Goals: Remission of depression with control of anxiety and panic attacks and improvement in ADD symptoms and sleep in order to  prevent relapse due to the chronic nature of her behavioral health problems and mental illness.  Increase doxepin to 100 mg at bedtime initially taking one half dose.  Start Adderall 10 mg 3 times daily.  Possible side effects discussed including decreased appetite and elevation of blood pressure.    Brandon De Oliveira M.D.      This note was created using voice recognition software (Dragon). The accuracy of the dictation is limited by the abilities of the software. I have reviewed the note prior to signing, however some errors in grammar and context are still possible. If you have any questions related to this note please do not hesitate to contact our office.

## 2025-02-08 DIAGNOSIS — K21.9 GASTROESOPHAGEAL REFLUX DISEASE, UNSPECIFIED WHETHER ESOPHAGITIS PRESENT: ICD-10-CM

## 2025-02-10 RX ORDER — OMEPRAZOLE 40 MG/1
40 CAPSULE, DELAYED RELEASE ORAL DAILY
Qty: 30 CAPSULE | Refills: 2 | Status: SHIPPED | OUTPATIENT
Start: 2025-02-10

## 2025-02-10 NOTE — TELEPHONE ENCOUNTER
Received request via: Pharmacy    Was the patient seen in the last year in this department? Yes    Does the patient have an active prescription (recently filled or refills available) for medication(s) requested? No    Pharmacy Name: [id number] - [place], [place] - [address]. [phone]     Does the patient have shelter Plus and need 100-day supply? (This applies to ALL medications) Patient does not have SCP

## 2025-02-11 ENCOUNTER — APPOINTMENT (OUTPATIENT)
Dept: VASCULAR LAB | Facility: MEDICAL CENTER | Age: 60
End: 2025-02-11
Attending: INTERNAL MEDICINE
Payer: MEDICAID

## 2025-02-18 ENCOUNTER — APPOINTMENT (OUTPATIENT)
Dept: VASCULAR LAB | Facility: MEDICAL CENTER | Age: 60
End: 2025-02-18
Attending: INTERNAL MEDICINE
Payer: MEDICAID

## 2025-02-24 DIAGNOSIS — M25.551 CHRONIC RIGHT HIP PAIN: ICD-10-CM

## 2025-02-24 DIAGNOSIS — M25.559 HIP PAIN: ICD-10-CM

## 2025-02-24 DIAGNOSIS — E11.42 TYPE 2 DIABETES MELLITUS WITH DIABETIC POLYNEUROPATHY, WITHOUT LONG-TERM CURRENT USE OF INSULIN (HCC): ICD-10-CM

## 2025-02-24 DIAGNOSIS — Z84.0 FAMILY HISTORY OF PSORIATIC ARTHRITIS: ICD-10-CM

## 2025-02-24 DIAGNOSIS — G89.29 CHRONIC RIGHT HIP PAIN: ICD-10-CM

## 2025-02-24 RX ORDER — HYDROXYZINE HYDROCHLORIDE 25 MG/1
TABLET, FILM COATED ORAL
Qty: 30 TABLET | Refills: 0 | Status: SHIPPED | OUTPATIENT
Start: 2025-02-24

## 2025-02-24 NOTE — TELEPHONE ENCOUNTER
Received request via: Pharmacy    Was the patient seen in the last year in this department? Yes    Does the patient have an active prescription (recently filled or refills available) for medication(s) requested? No    Pharmacy Name: [id number] - [place], [place] - [address]      Does the patient have [facility] Plus and need 100-day supply? (This applies to ALL medications) Patient does not have SCP

## 2025-02-25 RX ORDER — CYCLOBENZAPRINE HCL 5 MG
TABLET ORAL
Qty: 30 TABLET | Refills: 0 | Status: SHIPPED | OUTPATIENT
Start: 2025-02-25

## 2025-02-28 ENCOUNTER — TELEPHONE (OUTPATIENT)
Dept: HEALTH INFORMATION MANAGEMENT | Facility: OTHER | Age: 60
End: 2025-02-28
Payer: MEDICAID

## 2025-03-11 ENCOUNTER — OFFICE VISIT (OUTPATIENT)
Dept: BEHAVIORAL HEALTH | Facility: CLINIC | Age: 60
End: 2025-03-11
Payer: MEDICAID

## 2025-03-11 DIAGNOSIS — F98.8 ATTENTION DEFICIT DISORDER WITHOUT HYPERACTIVITY: ICD-10-CM

## 2025-03-11 DIAGNOSIS — F51.01 PRIMARY INSOMNIA: ICD-10-CM

## 2025-03-11 DIAGNOSIS — F33.1 MAJOR DEPRESSIVE DISORDER, RECURRENT EPISODE, MODERATE (HCC): ICD-10-CM

## 2025-03-11 DIAGNOSIS — F41.1 GENERALIZED ANXIETY DISORDER: ICD-10-CM

## 2025-03-11 PROCEDURE — 99214 OFFICE O/P EST MOD 30 MIN: CPT | Performed by: PSYCHIATRY & NEUROLOGY

## 2025-03-11 RX ORDER — DOXEPIN HYDROCHLORIDE 50 MG/1
50 CAPSULE ORAL NIGHTLY
Qty: 90 CAPSULE | Refills: 0 | Status: SHIPPED | OUTPATIENT
Start: 2025-03-11 | End: 2025-06-09

## 2025-03-11 RX ORDER — HYDROXYZINE HYDROCHLORIDE 25 MG/1
25 TABLET, FILM COATED ORAL 2 TIMES DAILY
Qty: 60 TABLET | Refills: 0 | Status: SHIPPED | OUTPATIENT
Start: 2025-03-11 | End: 2025-04-10

## 2025-03-11 RX ORDER — DEXTROAMPHETAMINE SACCHARATE, AMPHETAMINE ASPARTATE, DEXTROAMPHETAMINE SULFATE AND AMPHETAMINE SULFATE 3.75; 3.75; 3.75; 3.75 MG/1; MG/1; MG/1; MG/1
15 TABLET ORAL 3 TIMES DAILY
Qty: 90 TABLET | Refills: 0 | Status: SHIPPED | OUTPATIENT
Start: 2025-03-11 | End: 2025-04-10

## 2025-03-11 NOTE — PROGRESS NOTES
[facility]   Follow Up Assessment     This provider informed the patient their medical records are totally confidential except for the use by other providers involved in their care, or if the patient signs a release, or to report instances of child or elder abuse, or if it is determined they are an immediate risk to harm themselves or others.    Name: Chantale Greer  MRN: [id number]  : 1965  Age: 59 y.o.  Date of assessment: 3/11/2025  PCP: Davonte Saenz M.D.      Subjective:  Reviewed prior to seeing her in my office.  She was last seen on .  We reviewed her medications, doses, purposes, etc.  She would like to increase Adderall to 15 mg 3 times daily to help with her ADD symptoms.  She would like to decrease doxepin to 50 mg at bedtime again.  She uses hydroxyzine 25 mg 3-4 times per week but would like a larger supply.  She is anxious about flying to [place] to see her grandchildren soon.    Objective:  She is alert, oriented, and cooperative.  Relatedness is good.  Grooming is good.  Speech is normal rate.  Anxious.  Memory is good.  Insight and judgment are fairly good.  No indication of psychotic thinking.    Current Risk:       Suicidal: Not suicidal       Homicidal: Not homicidal       Self-Harm: No plan to harm self       Relapse: (Low/Moderate/High): Moderate       Crisis Safety Plan Reviewed: Discussed with patient    Diagnosis:   Major depressive disorder, recurrent  Generalized anxiety disorder with panic attacks  ADD  Insomnia    Treatment Plan:  Current treatment plan consists of a follow-up in 2 months and then quarterly psychiatric sessions designed to evaluate her depression, anxiety and panic attacks, ADD, and insomnia.    Duration will be for a minimum of 12 months and will be reviewed at each visit.    Goals: Remission of depression with control of anxiety and panic attacks and improvement in ADD symptoms and sleep in order to prevent relapse due to the chronic  nature of her behavioral health problems and mental illness.  She prefers to decrease doxepin to 50 mg at bedtime again.  Increase Adderall to 15 mg 3 times daily.  She is using hydroxyzine 25mg as needed.    Brandon De Oliveira M.D.      This note was created using voice recognition software (Dragon). The accuracy of the dictation is limited by the abilities of the software. I have reviewed the note prior to signing, however some errors in grammar and context are still possible. If you have any questions related to this note please do not hesitate to contact our office.

## 2025-03-22 DIAGNOSIS — Z84.0 FAMILY HISTORY OF PSORIATIC ARTHRITIS: ICD-10-CM

## 2025-03-22 DIAGNOSIS — M25.559 HIP PAIN: ICD-10-CM

## 2025-03-22 DIAGNOSIS — G89.29 CHRONIC RIGHT HIP PAIN: ICD-10-CM

## 2025-03-22 DIAGNOSIS — M25.551 CHRONIC RIGHT HIP PAIN: ICD-10-CM

## 2025-03-22 NOTE — TELEPHONE ENCOUNTER
Received request via: Pharmacy    Was the patient seen in the last year in this department? Yes    Does the patient have an active prescription (recently filled or refills available) for medication(s) requested? No    Pharmacy Name: [id number] - [place], [place] - [address].      Does the patient have [facility] Plus and need 100-day supply? (This applies to ALL medications) Patient does not have SCP

## 2025-04-01 DIAGNOSIS — L40.50 PSORIATIC ARTHRITIS (HCC): ICD-10-CM

## 2025-04-01 DIAGNOSIS — G43.809 OTHER MIGRAINE WITHOUT STATUS MIGRAINOSUS, NOT INTRACTABLE: ICD-10-CM

## 2025-04-01 RX ORDER — SUMATRIPTAN 50 MG/1
50 TABLET, FILM COATED ORAL
Qty: 13 TABLET | Refills: 0 | Status: SHIPPED | OUTPATIENT
Start: 2025-04-01

## 2025-04-01 RX ORDER — CYCLOBENZAPRINE HCL 5 MG
5-10 TABLET ORAL 3 TIMES DAILY PRN
Qty: 30 TABLET | Refills: 0 | Status: SHIPPED | OUTPATIENT
Start: 2025-04-01

## 2025-04-08 NOTE — Clinical Note
REFERRAL APPROVAL NOTICE         Sent on April 8, 2025                   Chantale Greer  [address] [place] [address]                   Dear Ms. Percy,    After a careful review of the medical information and benefit coverage, [facility] has processed your referral. See below for additional details.    If applicable, you must be actively enrolled with your insurance for coverage of the authorized service. If you have any questions regarding your coverage, please contact your insurance directly.    REFERRAL INFORMATION   Referral #:  [id number]  Referred-To Department    Referred-By Provider:  Rheumatology    Davonte Saenz M.D.   Rheumatology [facility]      [address]  [place] [address] [phone] [address], [address]  [place] [id number] [phone]    Referral Start Date:  04/01/2025  Referral End Date:   04/01/2026           SCHEDULING  If you do not already have an appointment, please call [phone] to make an appointment.   MORE INFORMATION  As a reminder, [facility] ownership has changed, meaning this location is now owned and operated by [facility]. As such, we want to clarify that our patients should expect to receive two separate bills for the services received at [facility] - one representing the [facility] facility fees as the owner of the establishment, and the other to represent the physician's services and subsequent fees. You can speak with your insurance carrier for a pricing estimate by calling the customer service number on the back of your card and ask about charges for a hospital outpatient visit.  If you do not already have a [facility] account, sign up at: [email or web address]  You can access your medical information, make appointments, see lab results, billing information, and more.  If you have questions regarding this referral, please contact  the [facility] Referrals department at:             [phone]. Monday - Friday  7:30AM - 5:00PM.      Sincerely,  [facility]

## 2025-04-14 ENCOUNTER — APPOINTMENT (OUTPATIENT)
Dept: VASCULAR LAB | Facility: MEDICAL CENTER | Age: 60
End: 2025-04-14
Attending: INTERNAL MEDICINE
Payer: MEDICAID

## 2025-04-15 ENCOUNTER — HOSPITAL ENCOUNTER (OUTPATIENT)
Dept: LAB | Facility: MEDICAL CENTER | Age: 60
End: 2025-04-15
Payer: MEDICAID

## 2025-04-15 DIAGNOSIS — L40.50 PSORIATIC ARTHRITIS (HCC): ICD-10-CM

## 2025-04-15 DIAGNOSIS — E86.0 DEHYDRATION: ICD-10-CM

## 2025-04-15 DIAGNOSIS — E11.42 TYPE 2 DM WITH DIABETIC NEUROPATHY AFFECTING BOTH SIDES OF BODY (HCC): ICD-10-CM

## 2025-04-15 DIAGNOSIS — Z84.0 FAMILY HISTORY OF PSORIATIC ARTHRITIS: ICD-10-CM

## 2025-04-15 LAB
ALBUMIN SERPL BCP-MCNC: 4.6 G/DL (ref 3.2–4.9)
ALBUMIN/GLOB SERPL: 1.5 G/DL
ALP SERPL-CCNC: 115 U/L (ref 30–99)
ALT SERPL-CCNC: 52 U/L (ref 2–50)
ANION GAP SERPL CALC-SCNC: 16 MMOL/L (ref 7–16)
APPEARANCE UR: CLEAR
AST SERPL-CCNC: 37 U/L (ref 12–45)
BASOPHILS # BLD AUTO: 0.6 % (ref 0–1.8)
BASOPHILS # BLD: 0.06 K/UL (ref 0–0.12)
BILIRUB SERPL-MCNC: 0.3 MG/DL (ref 0.1–1.5)
BILIRUB UR QL STRIP.AUTO: NEGATIVE
BUN SERPL-MCNC: 15 MG/DL (ref 8–22)
CALCIUM ALBUM COR SERPL-MCNC: 9.6 MG/DL (ref 8.5–10.5)
CALCIUM SERPL-MCNC: 10.1 MG/DL (ref 8.5–10.5)
CHLORIDE SERPL-SCNC: 106 MMOL/L (ref 96–112)
CO2 SERPL-SCNC: 24 MMOL/L (ref 20–33)
COLOR UR: YELLOW
CREAT SERPL-MCNC: 1.06 MG/DL (ref 0.5–1.4)
CREAT UR-MCNC: 116 MG/DL
EOSINOPHIL # BLD AUTO: 0.33 K/UL (ref 0–0.51)
EOSINOPHIL NFR BLD: 3.6 % (ref 0–6.9)
ERYTHROCYTE [DISTWIDTH] IN BLOOD BY AUTOMATED COUNT: 41.9 FL (ref 35.9–50)
GFR SERPLBLD CREATININE-BSD FMLA CKD-EPI: 60 ML/MIN/1.73 M 2
GLOBULIN SER CALC-MCNC: 3.1 G/DL (ref 1.9–3.5)
GLUCOSE SERPL-MCNC: 48 MG/DL (ref 65–99)
GLUCOSE UR STRIP.AUTO-MCNC: >=1000 MG/DL
HCT VFR BLD AUTO: 40.7 % (ref 37–47)
HGB BLD-MCNC: 13 G/DL (ref 12–16)
IMM GRANULOCYTES # BLD AUTO: 0.02 K/UL (ref 0–0.11)
IMM GRANULOCYTES NFR BLD AUTO: 0.2 % (ref 0–0.9)
KETONES UR STRIP.AUTO-MCNC: NEGATIVE MG/DL
LEUKOCYTE ESTERASE UR QL STRIP.AUTO: NEGATIVE
LYMPHOCYTES # BLD AUTO: 3.82 K/UL (ref 1–4.8)
LYMPHOCYTES NFR BLD: 41.2 % (ref 22–41)
MCH RBC QN AUTO: 25.9 PG (ref 27–33)
MCHC RBC AUTO-ENTMCNC: 31.9 G/DL (ref 32.2–35.5)
MCV RBC AUTO: 81.1 FL (ref 81.4–97.8)
MICRO URNS: ABNORMAL
MICROALBUMIN UR-MCNC: 4.7 MG/DL
MICROALBUMIN/CREAT UR: 41 MG/G (ref 0–30)
MONOCYTES # BLD AUTO: 0.39 K/UL (ref 0–0.85)
MONOCYTES NFR BLD AUTO: 4.2 % (ref 0–13.4)
NEUTROPHILS # BLD AUTO: 4.65 K/UL (ref 1.82–7.42)
NEUTROPHILS NFR BLD: 50.2 % (ref 44–72)
NITRITE UR QL STRIP.AUTO: NEGATIVE
NRBC # BLD AUTO: 0 K/UL
NRBC BLD-RTO: 0 /100 WBC (ref 0–0.2)
PH UR STRIP.AUTO: 5.5 [PH] (ref 5–8)
PLATELET # BLD AUTO: 245 K/UL (ref 164–446)
PMV BLD AUTO: 11.1 FL (ref 9–12.9)
POTASSIUM SERPL-SCNC: 3.2 MMOL/L (ref 3.6–5.5)
PROT SERPL-MCNC: 7.7 G/DL (ref 6–8.2)
PROT UR QL STRIP: NEGATIVE MG/DL
RBC # BLD AUTO: 5.02 M/UL (ref 4.2–5.4)
RBC UR QL AUTO: NEGATIVE
SODIUM SERPL-SCNC: 146 MMOL/L (ref 135–145)
SP GR UR STRIP.AUTO: 1.04
UROBILINOGEN UR STRIP.AUTO-MCNC: 1 EU/DL
WBC # BLD AUTO: 9.3 K/UL (ref 4.8–10.8)

## 2025-04-15 PROCEDURE — 82043 UR ALBUMIN QUANTITATIVE: CPT

## 2025-04-15 PROCEDURE — 83516 IMMUNOASSAY NONANTIBODY: CPT

## 2025-04-15 PROCEDURE — 80053 COMPREHEN METABOLIC PANEL: CPT

## 2025-04-15 PROCEDURE — 82570 ASSAY OF URINE CREATININE: CPT

## 2025-04-15 PROCEDURE — 36415 COLL VENOUS BLD VENIPUNCTURE: CPT

## 2025-04-15 PROCEDURE — 81003 URINALYSIS AUTO W/O SCOPE: CPT

## 2025-04-15 PROCEDURE — 85025 COMPLETE CBC W/AUTO DIFF WBC: CPT

## 2025-04-15 PROCEDURE — 83520 IMMUNOASSAY QUANT NOS NONAB: CPT

## 2025-04-15 PROCEDURE — 80053 COMPREHEN METABOLIC PANEL: CPT | Mod: 91

## 2025-04-16 ENCOUNTER — TELEPHONE (OUTPATIENT)
Dept: HOSPITALIST | Facility: MEDICAL CENTER | Age: 60
End: 2025-04-16
Payer: MEDICAID

## 2025-04-16 DIAGNOSIS — F98.8 ATTENTION DEFICIT DISORDER WITHOUT HYPERACTIVITY: ICD-10-CM

## 2025-04-16 LAB
ALBUMIN SERPL BCP-MCNC: 4.5 G/DL (ref 3.2–4.9)
ALBUMIN/GLOB SERPL: 1.4 G/DL
ALP SERPL-CCNC: 113 U/L (ref 30–99)
ALT SERPL-CCNC: 51 U/L (ref 2–50)
ANION GAP SERPL CALC-SCNC: 14 MMOL/L (ref 7–16)
AST SERPL-CCNC: 38 U/L (ref 12–45)
BILIRUB SERPL-MCNC: 0.4 MG/DL (ref 0.1–1.5)
BUN SERPL-MCNC: 15 MG/DL (ref 8–22)
CALCIUM ALBUM COR SERPL-MCNC: 9.8 MG/DL (ref 8.5–10.5)
CALCIUM SERPL-MCNC: 10.2 MG/DL (ref 8.5–10.5)
CHLORIDE SERPL-SCNC: 106 MMOL/L (ref 96–112)
CO2 SERPL-SCNC: 23 MMOL/L (ref 20–33)
CREAT SERPL-MCNC: 1.05 MG/DL (ref 0.5–1.4)
GFR SERPLBLD CREATININE-BSD FMLA CKD-EPI: 61 ML/MIN/1.73 M 2
GLOBULIN SER CALC-MCNC: 3.2 G/DL (ref 1.9–3.5)
GLUCOSE SERPL-MCNC: 51 MG/DL (ref 65–99)
POTASSIUM SERPL-SCNC: 3.2 MMOL/L (ref 3.6–5.5)
PROT SERPL-MCNC: 7.7 G/DL (ref 6–8.2)
SODIUM SERPL-SCNC: 143 MMOL/L (ref 135–145)

## 2025-04-16 RX ORDER — DEXTROAMPHETAMINE SACCHARATE, AMPHETAMINE ASPARTATE, DEXTROAMPHETAMINE SULFATE AND AMPHETAMINE SULFATE 3.75; 3.75; 3.75; 3.75 MG/1; MG/1; MG/1; MG/1
15 TABLET ORAL 3 TIMES DAILY
Qty: 90 TABLET | Refills: 0 | Status: SHIPPED | OUTPATIENT
Start: 2025-04-16 | End: 2025-05-16

## 2025-04-16 NOTE — TELEPHONE ENCOUNTER
Paged by [facility] [employer] for critical lab result of glucose of  51 on CMP drawn 4/15, at 1605 ordered by Dr. Saenz. Of note, pt is on insulin for T2DM and could have taken more insulin than needed. Pt also with hypokalemia. Called pt on phone number in chart and left a voicemail stating pt need to get these labs redrawn when she got the voicemail either at urgent care or emergency room.     Lexy Dang, DO  PGY-2, [facility]

## 2025-04-16 NOTE — TELEPHONE ENCOUNTER
Next appt with Yennifer on 5/13.     Received request via: Patient    Was the patient seen in the last year in this department? Yes    Does the patient have an active prescription (recently filled or refills available) for medication(s) requested? No    [facility]     Does the patient have retirement Plus and need 100-day supply? (This applies to ALL medications) Patient does not have SCP

## 2025-04-18 LAB — CARBAMYLATED PROTEIN ANTIBODY, IGG Q6043: 6 UNITS (ref 0–19)

## 2025-04-22 LAB — MISCELLANEOUS LAB RESULT MISCLAB: NORMAL

## 2025-05-01 ENCOUNTER — APPOINTMENT (OUTPATIENT)
Dept: VASCULAR LAB | Facility: MEDICAL CENTER | Age: 60
End: 2025-05-01
Payer: MEDICAID

## 2025-05-01 RX ORDER — NAPROXEN 375 MG/1
375 TABLET ORAL 2 TIMES DAILY WITH MEALS
Qty: 60 TABLET | Status: CANCELLED | OUTPATIENT
Start: 2025-05-01

## 2025-05-01 RX ORDER — NAPROXEN 375 MG/1
375 TABLET ORAL 2 TIMES DAILY WITH MEALS
COMMUNITY
End: 2025-05-05 | Stop reason: SDUPTHER

## 2025-05-01 NOTE — TELEPHONE ENCOUNTER
Received request via: Pharmacy    Was the patient seen in the last year in this department? Yes    Does the patient have an active prescription (recently filled or refills available) for medication(s) requested? No    Pharmacy Name: [id number] - [place], [place] - [address].

## 2025-05-05 ENCOUNTER — APPOINTMENT (OUTPATIENT)
Dept: MEDICAL GROUP | Facility: CLINIC | Age: 60
End: 2025-05-05
Payer: MEDICAID

## 2025-05-05 VITALS
WEIGHT: 176 LBS | TEMPERATURE: 97.7 F | BODY MASS INDEX: 27.62 KG/M2 | OXYGEN SATURATION: 93 % | DIASTOLIC BLOOD PRESSURE: 77 MMHG | SYSTOLIC BLOOD PRESSURE: 121 MMHG | HEART RATE: 91 BPM | HEIGHT: 67 IN

## 2025-05-05 DIAGNOSIS — L40.50 PSORIATIC ARTHRITIS (HCC): ICD-10-CM

## 2025-05-05 DIAGNOSIS — G89.29 OTHER CHRONIC PAIN: ICD-10-CM

## 2025-05-05 DIAGNOSIS — F41.9 ANXIETY: ICD-10-CM

## 2025-05-05 DIAGNOSIS — F33.41 RECURRENT MAJOR DEPRESSIVE DISORDER, IN PARTIAL REMISSION (HCC): ICD-10-CM

## 2025-05-05 PROCEDURE — 99214 OFFICE O/P EST MOD 30 MIN: CPT

## 2025-05-05 PROCEDURE — 3074F SYST BP LT 130 MM HG: CPT

## 2025-05-05 PROCEDURE — 3078F DIAST BP <80 MM HG: CPT

## 2025-05-05 RX ORDER — CIPROFLOXACIN 500 MG/1
500 TABLET, FILM COATED ORAL 2 TIMES DAILY
COMMUNITY

## 2025-05-05 RX ORDER — NAPROXEN 375 MG/1
375 TABLET ORAL
Qty: 30 TABLET | Refills: 1 | Status: SHIPPED | OUTPATIENT
Start: 2025-05-05

## 2025-05-05 RX ORDER — DULOXETIN HYDROCHLORIDE 30 MG/1
30 CAPSULE, DELAYED RELEASE ORAL DAILY
Qty: 30 CAPSULE | Refills: 2 | Status: SHIPPED | OUTPATIENT
Start: 2025-05-05 | End: 2025-08-03

## 2025-05-05 ASSESSMENT — ANXIETY QUESTIONNAIRES
5. BEING SO RESTLESS THAT IT IS HARD TO SIT STILL: MORE THAN HALF THE DAYS
3. WORRYING TOO MUCH ABOUT DIFFERENT THINGS: NEARLY EVERY DAY
2. NOT BEING ABLE TO STOP OR CONTROL WORRYING: MORE THAN HALF THE DAYS
GAD7 TOTAL SCORE: 16
1. FEELING NERVOUS, ANXIOUS, OR ON EDGE: MORE THAN HALF THE DAYS
4. TROUBLE RELAXING: NEARLY EVERY DAY
6. BECOMING EASILY ANNOYED OR IRRITABLE: MORE THAN HALF THE DAYS
7. FEELING AFRAID AS IF SOMETHING AWFUL MIGHT HAPPEN: MORE THAN HALF THE DAYS

## 2025-05-05 ASSESSMENT — PATIENT HEALTH QUESTIONNAIRE - PHQ9
CLINICAL INTERPRETATION OF PHQ2 SCORE: 3
5. POOR APPETITE OR OVEREATING: 2 - MORE THAN HALF THE DAYS
SUM OF ALL RESPONSES TO PHQ QUESTIONS 1-9: 14

## 2025-05-05 ASSESSMENT — FIBROSIS 4 INDEX: FIB4 SCORE: 1.28

## 2025-05-05 NOTE — PROGRESS NOTES
[facility] MEDICINE     PATIENT ID:  NAME:  Chantale Greer  MRN:               [id number]  YOB: 1965    Date: 2:47 PM      Fellow: Davonte Saenz M.D.    CC:  Chronic pain      HPI: Chantale Greer is a 59 y.o. female who presented with concerns about her chronic pain. Patient states she has not yet followed up with rheumatology but has seen endocrinology who took over care of her diabetes which she describes as improved. She notes she still has chronic pain to multiple joints including bilateral hips and upper extremities. She states she has been medicating with gabapentin, flexeril and tylenol with some control of her chronic pain but is looking for better options. Patient notes that she has not followed up with pain management since referral last year. Otherwise patient notes increasing anxiety due to her pain. Otherwise patient reports no other concerns at this time.     No problems updated.    REVIEW OF SYSTEMS:   Ten systems reviewed and were negative except as noted in the HPI.                PROBLEM LIST  Patient Active Problem List   Diagnosis    Dehydration    Type 2 DM with diabetic neuropathy affecting both sides of body (HCC)    Pain, dental    Hip pain    Depression    Type 2 diabetes mellitus with diabetic polyneuropathy, without long-term current use of insulin (HCC)    Back pain    HTN (hypertension)    Pain in both hands    Nausea    Dysuria    Screening for colon cancer    Asthma    Family history of psoriatic arthritis    Neuropathy    Acute pharyngitis    Major depressive disorder, recurrent episode, moderate (HCC)    Generalized anxiety disorder    Primary insomnia    Attention deficit disorder without hyperactivity        PAST SURGICAL HISTORY:  Past Surgical History:   Procedure Laterality Date    GYN SURGERY      c section, tubal    OTHER ABDOMINAL SURGERY      gastric sleeve       FAMILY HISTORY:  No family history on file.    SOCIAL HISTORY:   Social History     Tobacco  Use    Smoking status: Former     Current packs/day: 0.00     Types: Cigarettes     Quit date:      Years since quittin.3    Smokeless tobacco: Never   Substance Use Topics    Alcohol use: Never       ALLERGIES:  No Known Allergies    OUTPATIENT MEDICATIONS:    Current Outpatient Medications:     ciprofloxacin (CIPRO) 500 MG Tab, Take 500 mg by mouth 2 times a day. (Patient taking differently: Take 250 mg by mouth 2 times a day.), Disp: , Rfl:     DULoxetine (CYMBALTA) 30 MG Cap DR Particles, Take 1 Capsule by mouth every day for 90 days., Disp: 30 Capsule, Rfl: 2    naproxen (NAPROSYN) 375 MG Tab, Take 1 Tablet by mouth one time as needed (joint pain) for up to 30 doses., Disp: 30 Tablet, Rfl: 1    amphetamine-dextroamphetamine (ADDERALL, 15MG,) 15 MG tablet, Take 1 Tablet by mouth 3 times a day for 30 days., Disp: 90 Tablet, Rfl: 0    cyclobenzaprine (FLEXERIL) 5 mg tablet, Take 1-2 Tablets by mouth 3 times a day as needed for Moderate Pain or Muscle Spasms., Disp: 30 Tablet, Rfl: 0    SUMAtriptan (IMITREX) 50 MG Tab, Take 1 Tablet by mouth one time as needed for Migraine for up to 1 dose., Disp: 13 Tablet, Rfl: 0    doxepin (SINEQUAN) 50 MG Cap, Take 1 Capsule by mouth every evening for 90 days., Disp: 90 Capsule, Rfl: 0    omeprazole (PRILOSEC) 40 MG delayed-release capsule, Take 1 Capsule by mouth every day., Disp: 30 Capsule, Rfl: 2    albuterol (PROAIR HFA) 108 (90 Base) MCG/ACT Aero Soln inhalation aerosol, Inhale 2 Puffs every four hours as needed for Shortness of Breath., Disp: 8.5 Each, Rfl: 2    Blood Glucose Meter Kit, Test blood sugar as recommended by provider. Insurance preference blood glucose monitoring kit., Disp: 1 Kit, Rfl: 0    Blood Glucose Test Strips, Use one strip to test blood sugar once daily early morning before first meal., Disp: 100 Strip, Rfl: 3    Lancets, Use one lancet to test blood sugar once daily early morning before first meal., Disp: 100 Each, Rfl: 3    Alcohol Swabs,  "Wipe site with prep pad prior to injection., Disp: 100 Each, Rfl: 3    JARDIANCE 25 MG Tab, Take 25 mg by mouth every day., Disp: 30 Tablet, Rfl: 5    Semaglutide, 1 MG/DOSE, (OZEMPIC, 1 MG/DOSE,) 4 MG/3ML Solution Pen-injector, Inject 1 mg under the skin every 7 days., Disp: 3 mL, Rfl: 5    gabapentin (NEURONTIN) 600 MG tablet, Take 1 Tablet by mouth 3 times a day., Disp: 270 Tablet, Rfl: 3    lisinopril (PRINIVIL) 10 MG Tab, Take 1 Tablet by mouth every day., Disp: 90 Tablet, Rfl: 3    insulin glargine 100 UNIT/ML SC SOLN, Inject 10 Units under the skin every evening. (Patient taking differently: Inject 15 Units under the skin every evening.), Disp: 10 mL, Rfl: 0    simvastatin (ZOCOR) 20 MG Tab, Take 1 Tablet by mouth every evening., Disp: 90 Tablet, Rfl: 3    Insulin Pen Needle (PEN NEEDLES) 31G X 6 MM Misc, 1 Application as needed (to provide insulin)., Disp: 300 Each, Rfl: 2    Apremilast 10 & 20 & 30 MG Tablet Therapy Pack, Please dispense 28 days starter pack. Day 1 (10mg PO AM), Day 2 (10mg PO AM and 10mg PO PM), Day 3 (10mg PO AM and 20mg PO PM), Day 4 (20mg PO AM and 20mg PO PM), Day 5 (20mg PO AM and 30mg PO PM), Continue thereafter 30mg PO AM and 30mg PO PM, Disp: 1 Each, Rfl: 0    PHYSICAL EXAM:  Vitals:    05/05/25 1354   BP: 121/77   BP Location: Left arm   Patient Position: Sitting   Pulse: 91   Temp: 36.5 °C (97.7 °F)   SpO2: 93%   Weight: 79.8 kg (176 lb)   Height: 1.702 m (5' 7\")       General: Pt resting in NAD, pleasant and cooperative   Skin:  Pink, warm and dry.  HEENT: NC/AT. EOMI. PERRLA, no anterior neck mass or lymphadenopathy  Lungs:  Symmetrical.  CTAB, good air movement, no adventitious sounds  Cardiovascular:  S1/S2 RRR, no murmurs rubs or gallops, no lower extremity edema  Abdomen:  Abdomen is soft, nontender, no distention, no peritoneal signs  Extremities:  Full range of motion. Patient notes subjective pain to bilateral hands, elbows, shoulders and hips, no swelling, no skin " changes, no wounds  CNS:  Muscle tone is normal. No gross focal neurologic deficits      ASSESSMENT/PLAN:   59 y.o. female who presents to clinic for discussion of chronic pain. Patient notes that she has not followed up with rheumatology or pain management since referral last year but was able to see endocrinology and states she fees her DM status is improving. She notes worsening of her anxiety due to her chronic pain and would like to restart an anti inflammatory. Otherwise the patient reports that she is at her baseline state of health but wants to start medication for her psoriatic arthritis as soon as possible. Medications were prescribed from PCP office at time of diagnosis last year but insurance declined due to no rheumatology on board. On examination today patient appears grossly normal as above. Discussed medication side effects of naproxen including concern of decreased renal function with prolonged use. Encouraged importance of follow up with pain management and rheumatology for improved pain control options and medications to control baseline inflammatory process. Encouraged improvement in DM status as adjunctive to control baseline inflammation as well.   Discussed black box warning with cymbalta and serotonin syndrome risk with doxepin as well as red flag symptoms and when to be seen in the ED. Patient has been on the medication in the past and did not have any associated side effect and would like to restart as adjunctive to neuropathic pain control.     Will plan to follow up in 3 months or sooner if any concerns.     Problem List Items Addressed This Visit       Depression    Relevant Medications    DULoxetine (CYMBALTA) 30 MG Cap DR Particles     Other Visit Diagnoses         Anxiety        Relevant Medications    DULoxetine (CYMBALTA) 30 MG Cap DR Particles      Other chronic pain        Relevant Medications    DULoxetine (CYMBALTA) 30 MG Cap DR Particles    naproxen (NAPROSYN) 375 MG Tab             Davonte Saenz M.D.  PGY-4, Wilderness Fellow  [facility]

## 2025-05-06 DIAGNOSIS — G89.29 OTHER CHRONIC PAIN: ICD-10-CM

## 2025-05-06 NOTE — TELEPHONE ENCOUNTER
Received request via: Pharmacy    Was the patient seen in the last year in this department? Yes    Does the patient have an active prescription (recently filled or refills available) for medication(s) requested? No    Pharmacy Name: [facility] - [place], [place] - [address].     Does the patient have custodial Plus and need 100-day supply? (This applies to ALL medications) Patient does not have SCP

## 2025-05-07 DIAGNOSIS — E11.42 TYPE 2 DM WITH DIABETIC NEUROPATHY AFFECTING BOTH SIDES OF BODY (HCC): ICD-10-CM

## 2025-05-07 NOTE — TELEPHONE ENCOUNTER
Received request via: Pharmacy    Was the patient seen in the last year in this department? Yes    Does the patient have an active prescription (recently filled or refills available) for medication(s) requested? No    Pharmacy Name: [facility] - [place], [place] - [address] [facility].

## 2025-05-08 DIAGNOSIS — G89.29 CHRONIC RIGHT HIP PAIN: ICD-10-CM

## 2025-05-08 DIAGNOSIS — J45.909 ASTHMA, UNSPECIFIED ASTHMA SEVERITY, UNSPECIFIED WHETHER COMPLICATED, UNSPECIFIED WHETHER PERSISTENT: ICD-10-CM

## 2025-05-08 DIAGNOSIS — M25.551 CHRONIC RIGHT HIP PAIN: ICD-10-CM

## 2025-05-08 DIAGNOSIS — Z84.0 FAMILY HISTORY OF PSORIATIC ARTHRITIS: ICD-10-CM

## 2025-05-08 DIAGNOSIS — E11.42 TYPE 2 DM WITH DIABETIC NEUROPATHY AFFECTING BOTH SIDES OF BODY (HCC): ICD-10-CM

## 2025-05-08 DIAGNOSIS — M25.559 HIP PAIN: ICD-10-CM

## 2025-05-08 DIAGNOSIS — K21.9 GASTROESOPHAGEAL REFLUX DISEASE, UNSPECIFIED WHETHER ESOPHAGITIS PRESENT: ICD-10-CM

## 2025-05-08 DIAGNOSIS — G43.809 OTHER MIGRAINE WITHOUT STATUS MIGRAINOSUS, NOT INTRACTABLE: ICD-10-CM

## 2025-05-08 RX ORDER — ALBUTEROL SULFATE 90 UG/1
2 INHALANT RESPIRATORY (INHALATION) EVERY 4 HOURS PRN
Qty: 8.5 EACH | Refills: 2 | Status: SHIPPED | OUTPATIENT
Start: 2025-05-08

## 2025-05-08 RX ORDER — INSULIN GLARGINE 100 [IU]/ML
15 INJECTION, SOLUTION SUBCUTANEOUS EVERY EVENING
Qty: 15 ML | Refills: 0 | Status: SHIPPED | OUTPATIENT
Start: 2025-05-08 | End: 2025-08-06

## 2025-05-08 RX ORDER — OMEPRAZOLE 40 MG/1
40 CAPSULE, DELAYED RELEASE ORAL DAILY
Qty: 30 CAPSULE | Refills: 2 | Status: SHIPPED | OUTPATIENT
Start: 2025-05-08

## 2025-05-08 RX ORDER — SUMATRIPTAN 50 MG/1
50 TABLET, FILM COATED ORAL
Qty: 13 TABLET | Refills: 0 | Status: SHIPPED | OUTPATIENT
Start: 2025-05-08

## 2025-05-08 RX ORDER — CYCLOBENZAPRINE HCL 5 MG
5-10 TABLET ORAL 3 TIMES DAILY PRN
Qty: 30 TABLET | Refills: 0 | Status: SHIPPED | OUTPATIENT
Start: 2025-05-08

## 2025-05-08 RX ORDER — SEMAGLUTIDE 1.34 MG/ML
1 INJECTION, SOLUTION SUBCUTANEOUS
Qty: 3 ML | Refills: 5 | Status: SHIPPED | OUTPATIENT
Start: 2025-05-08 | End: 2025-05-09 | Stop reason: SDUPTHER

## 2025-05-08 NOTE — TELEPHONE ENCOUNTER
Received request via: Patient    Was the patient seen in the last year in this department? Yes    Does the patient have an active prescription (recently filled or refills available) for medication(s) requested? No    Pharmacy Name:[facility]     Does the patient have detention Plus and need 100-day supply? (This applies to ALL medications) Patient does not have SCP

## 2025-05-08 NOTE — TELEPHONE ENCOUNTER
Received request via: Patient    Was the patient seen in the last year in this department? Yes    Does the patient have an active prescription (recently filled or refills available) for medication(s) requested? No    Pharmacy Name:   [facility] - [place] - [address]  [address] [place] [address]  Phone: [phone] Fax: [phone]      Does the patient have [facility] Plus and need 100-day supply? (This applies to ALL medications) Patient does not have SCP

## 2025-05-09 RX ORDER — SEMAGLUTIDE 1.34 MG/ML
1 INJECTION, SOLUTION SUBCUTANEOUS
Qty: 9 ML | Refills: 1 | Status: SHIPPED | OUTPATIENT
Start: 2025-05-09

## 2025-05-13 ENCOUNTER — OFFICE VISIT (OUTPATIENT)
Dept: BEHAVIORAL HEALTH | Facility: CLINIC | Age: 60
End: 2025-05-13
Payer: MEDICAID

## 2025-05-13 DIAGNOSIS — F51.01 PRIMARY INSOMNIA: ICD-10-CM

## 2025-05-13 DIAGNOSIS — F33.1 MAJOR DEPRESSIVE DISORDER, RECURRENT EPISODE, MODERATE (HCC): ICD-10-CM

## 2025-05-13 DIAGNOSIS — F98.8 ATTENTION DEFICIT DISORDER WITHOUT HYPERACTIVITY: ICD-10-CM

## 2025-05-13 DIAGNOSIS — F41.1 GENERALIZED ANXIETY DISORDER: ICD-10-CM

## 2025-05-13 PROCEDURE — 99214 OFFICE O/P EST MOD 30 MIN: CPT | Performed by: PSYCHIATRY & NEUROLOGY

## 2025-05-13 RX ORDER — DEXTROAMPHETAMINE SACCHARATE, AMPHETAMINE ASPARTATE, DEXTROAMPHETAMINE SULFATE AND AMPHETAMINE SULFATE 5; 5; 5; 5 MG/1; MG/1; MG/1; MG/1
20 TABLET ORAL NIGHTLY
Qty: 90 EACH | Refills: 0 | Status: SHIPPED | OUTPATIENT
Start: 2025-05-13 | End: 2025-05-20 | Stop reason: SDUPTHER

## 2025-05-13 RX ORDER — HYDROXYZINE HYDROCHLORIDE 25 MG/1
25 TABLET, FILM COATED ORAL NIGHTLY
Qty: 90 TABLET | Refills: 0 | Status: SHIPPED | OUTPATIENT
Start: 2025-05-13 | End: 2025-08-11

## 2025-05-13 NOTE — PROGRESS NOTES
[facility]   Follow Up Assessment     This provider informed the patient their medical records are totally confidential except for the use by other providers involved in their care, or if the patient signs a release, or to report instances of child or elder abuse, or if it is determined they are an immediate risk to harm themselves or others.    Name: Chantale Greer  MRN: [id number]  : 1965  Age: 59 y.o.  Date of assessment: 2025  PCP: Davonte Saenz M.D.      Subjective:  Chart reviewed prior to seeing her in my office.  She was seen most recently on .  We reviewed her medications, doses, purposes, etc.  She would like to increase Adderall from 15 mg 3 times daily to 20 mg 3 times daily.  She uses hydroxyzine 25 as needed for anxiety.  She has been on doxepin 50 mg at bedtime but Cymbalta 30 mg a.m. has been added by another MD.  We talked about the risk of serotonin syndrome.  Doxepin 50 mg at bedtime will be increased.  It will be necessary to increase Cymbalta to help with her chronic pain problems.  The trip to [place] went well.    Objective:  She is alert, oriented, and cooperative.  Relatedness is good.  Grooming is good.  Speech is normal rate.  Anxious.  Memory is good.  Insight and judgment are fairly good.  No indication of psychotic thinking.    Current Risk:       Suicidal: Not suicidal       Homicidal: Not homicidal       Self-Harm: No plan to harm self       Relapse: (Low/Moderate/High): Moderate       Crisis Safety Plan Reviewed: Discussed with patient    Diagnosis:   Major depressive disorder, recurrent  Generalized anxiety disorder with panic attacks  ADD  Insomnia    Treatment Plan:  The current treatment plan consists of a follow-up visit in 1 month and then quarterly psychiatric sessions designed to evaluate her depression, anxiety and panic attacks, ADD, and insomnia.    Duration will be for a minimum of 12 months and will be reviewed at each  visit.    Goals: Remission of depression with control of anxiety and panic attacks and improvement in ADD symptoms and sleep in order to prevent relapse due to the chronic nature of her behavioral health problems and mental illness.  Discontinue doxepin 50 mg at bedtime.  Continue Cymbalta 30 mg a.m. but it will be increased to 60 mg a.m.  Increase Adderall to 20 mg 3 times daily.  Use hydroxyzine 25 mg at bedtime for insomnia.        Brandon De Oliveira M.D.      This note was created using voice recognition software (Dragon). The accuracy of the dictation is limited by the abilities of the software. I have reviewed the note prior to signing, however some errors in grammar and context are still possible. If you have any questions related to this note please do not hesitate to contact our office.

## 2025-05-19 RX ORDER — NAPROXEN 375 MG/1
375 TABLET ORAL
Qty: 30 TABLET | Refills: 1 | OUTPATIENT
Start: 2025-05-19

## 2025-05-20 ENCOUNTER — PATIENT MESSAGE (OUTPATIENT)
Dept: BEHAVIORAL HEALTH | Facility: CLINIC | Age: 60
End: 2025-05-20
Payer: MEDICAID

## 2025-05-20 DIAGNOSIS — F98.8 ATTENTION DEFICIT DISORDER WITHOUT HYPERACTIVITY: ICD-10-CM

## 2025-05-21 ENCOUNTER — APPOINTMENT (OUTPATIENT)
Dept: VASCULAR LAB | Facility: MEDICAL CENTER | Age: 60
End: 2025-05-21
Attending: INTERNAL MEDICINE
Payer: MEDICAID

## 2025-05-21 RX ORDER — DEXTROAMPHETAMINE SACCHARATE, AMPHETAMINE ASPARTATE, DEXTROAMPHETAMINE SULFATE AND AMPHETAMINE SULFATE 5; 5; 5; 5 MG/1; MG/1; MG/1; MG/1
20 TABLET ORAL 3 TIMES DAILY
Qty: 90 EACH | Refills: 0 | Status: SHIPPED | OUTPATIENT
Start: 2025-05-21 | End: 2025-05-29 | Stop reason: SDUPTHER

## 2025-05-23 DIAGNOSIS — F98.8 ATTENTION DEFICIT DISORDER WITHOUT HYPERACTIVITY: ICD-10-CM

## 2025-05-23 RX ORDER — DEXTROAMPHETAMINE SACCHARATE, AMPHETAMINE ASPARTATE, DEXTROAMPHETAMINE SULFATE AND AMPHETAMINE SULFATE 3.75; 3.75; 3.75; 3.75 MG/1; MG/1; MG/1; MG/1
15 TABLET ORAL 3 TIMES DAILY
Qty: 90 TABLET | Refills: 0 | Status: SHIPPED | OUTPATIENT
Start: 2025-05-23 | End: 2025-05-23 | Stop reason: SDUPTHER

## 2025-05-23 RX ORDER — DEXTROAMPHETAMINE SACCHARATE, AMPHETAMINE ASPARTATE, DEXTROAMPHETAMINE SULFATE AND AMPHETAMINE SULFATE 3.75; 3.75; 3.75; 3.75 MG/1; MG/1; MG/1; MG/1
15 TABLET ORAL 3 TIMES DAILY
Qty: 90 TABLET | Refills: 0 | Status: SHIPPED
Start: 2025-05-23 | End: 2025-05-29

## 2025-05-23 NOTE — TELEPHONE ENCOUNTER
PHARMACY REQUESTING US TO RESEND SCRIPT WITH NOTE NOT TO FILL UNTIL OTHER SCRIPT IS FINISHED.     Received request via: Pharmacy    Was the patient seen in the last year in this department? Yes    Does the patient have an active prescription (recently filled or refills available) for medication(s) requested? No    [facility] Name[facility]     Does the patient have group home Plus and need 100-day supply? (This applies to ALL medications) Patient does not have SCP

## 2025-05-23 NOTE — TELEPHONE ENCOUNTER
Pt requesting 15mg fill because ins will not cover another fill for the 20mgs.    Received request via: Patient    Was the patient seen in the last year in this department? Yes    Does the patient have an active prescription (recently filled or refills available) for medication(s) requested? No    [facility]     Does the patient have detention Plus and need 100-day supply? (This applies to ALL medications) Patient does not have SCP

## 2025-05-29 DIAGNOSIS — I10 PRIMARY HYPERTENSION: ICD-10-CM

## 2025-05-29 DIAGNOSIS — F98.8 ATTENTION DEFICIT DISORDER WITHOUT HYPERACTIVITY: ICD-10-CM

## 2025-05-29 RX ORDER — DEXTROAMPHETAMINE SACCHARATE, AMPHETAMINE ASPARTATE, DEXTROAMPHETAMINE SULFATE AND AMPHETAMINE SULFATE 5; 5; 5; 5 MG/1; MG/1; MG/1; MG/1
20 TABLET ORAL 3 TIMES DAILY
Qty: 90 EACH | Refills: 0 | Status: SHIPPED | OUTPATIENT
Start: 2025-05-29 | End: 2025-06-28

## 2025-05-29 NOTE — TELEPHONE ENCOUNTER
Caller Name: Chantale Greer  Call Back Number: [phone]    How would the patient prefer to be contacted with a response: Phone call do NOT leave a detailed message    Patient states the pharmacy has not filled the amphetamine-dextroamphetamine (ADDERALL) prescription. Patient states pharmacy needs to verify the dose she is on. Requesting a call to the pharmacy.

## 2025-05-29 NOTE — TELEPHONE ENCOUNTER
Caller Name: [facility]  Phone Number Called: [phone]      Call outcome:  Spoke to pharmacist regarding message below.     Message: Spoke with pharmacist about the prior authorization outcome. Pharmacist states she will call patient's insurance and request an early refill. Will follow up with pharmacy tomorrow.

## 2025-05-29 NOTE — TELEPHONE ENCOUNTER
Caller Name: [facility]  Phone Number Called: [phone]     Call outcome:  Spoke to pharmacist regarding message below.    Message: Pharmacist states patient picked up a prescription for the     amphetamine-dextroamphetamine (ADDERALL) 20 MG 30 per 30 days. Provider has sent a new prescription with a note to the pharmacy stating patient's medication was increased. Will call back pharmacy to follow up on the new prescription.

## 2025-05-30 NOTE — TELEPHONE ENCOUNTER
Caller Name: [facility]  Phone Number Called: [phone]      Call outcome:  Spoke to pharmacist regarding message below.     Message: Pharmacist states insurance approved the override for the      amphetamine-dextroamphetamine (ADDERALL) 20 MG 90 per 30 days. Pharmacist states prescription is ready for patient to  with a zero dollar copayment.

## 2025-06-02 RX ORDER — LISINOPRIL 10 MG/1
10 TABLET ORAL
Qty: 360 TABLET | Refills: 0 | Status: SHIPPED | OUTPATIENT
Start: 2025-06-02

## 2025-06-12 ENCOUNTER — APPOINTMENT (OUTPATIENT)
Dept: BEHAVIORAL HEALTH | Facility: CLINIC | Age: 60
End: 2025-06-12
Payer: MEDICAID

## 2025-06-25 ENCOUNTER — APPOINTMENT (OUTPATIENT)
Dept: BEHAVIORAL HEALTH | Facility: CLINIC | Age: 60
End: 2025-06-25
Payer: MEDICAID

## 2025-06-25 ENCOUNTER — OFFICE VISIT (OUTPATIENT)
Dept: BEHAVIORAL HEALTH | Facility: CLINIC | Age: 60
End: 2025-06-25
Payer: MEDICAID

## 2025-06-25 DIAGNOSIS — F41.1 GENERALIZED ANXIETY DISORDER: ICD-10-CM

## 2025-06-25 DIAGNOSIS — F33.1 MAJOR DEPRESSIVE DISORDER, RECURRENT EPISODE, MODERATE (HCC): Primary | ICD-10-CM

## 2025-06-25 DIAGNOSIS — F51.01 PRIMARY INSOMNIA: ICD-10-CM

## 2025-06-25 DIAGNOSIS — F98.8 ATTENTION DEFICIT DISORDER WITHOUT HYPERACTIVITY: ICD-10-CM

## 2025-06-25 PROCEDURE — 99214 OFFICE O/P EST MOD 30 MIN: CPT | Performed by: PSYCHIATRY & NEUROLOGY

## 2025-06-25 RX ORDER — DEXTROAMPHETAMINE SACCHARATE, AMPHETAMINE ASPARTATE, DEXTROAMPHETAMINE SULFATE AND AMPHETAMINE SULFATE 7.5; 7.5; 7.5; 7.5 MG/1; MG/1; MG/1; MG/1
30 TABLET ORAL 2 TIMES DAILY
Qty: 60 TABLET | Refills: 0 | Status: SHIPPED | OUTPATIENT
Start: 2025-06-25 | End: 2025-07-25

## 2025-06-25 RX ORDER — ALPRAZOLAM 0.5 MG
0.5 TABLET ORAL 3 TIMES DAILY
Qty: 90 TABLET | Refills: 0 | Status: SHIPPED | OUTPATIENT
Start: 2025-06-25 | End: 2025-07-25

## 2025-06-25 NOTE — PROGRESS NOTES
[facility]   Follow Up Assessment     This provider informed the patient their medical records are totally confidential except for the use by other providers involved in their care, or if the patient signs a release, or to report instances of child or elder abuse, or if it is determined they are an immediate risk to harm themselves or others.    Name: Chantale Greer  MRN: [id number]  : 1965  Age: 59 y.o.  Date of assessment: 2025  PCP: Davonte Saenz M.D.      Subjective:  Chart reviewed prior to seeing her in my office.  We reviewed her current medications, purposes, doses, etc.  Cymbalta 30 mg a.m. because nausea.  She recalls doing well on Prozac previously.  Hydroxyzine 25 mg has not helped her anxiety or insomnia.  Xanax was helpful for both when tried previously.    Objective:  She is alert, oriented, and cooperative.  Relatedness is good.  Grooming is good.  Speech is normal rate.  Sad and anxious.  Memory is good.  Insight and judgment are fairly good.  No indication of psychotic thinking.    Current Risk:       Suicidal: Not suicidal       Homicidal: Not homicidal       Self-Harm: No plan to harm self       Relapse: (Low/Moderate/High): Moderate       Crisis Safety Plan Reviewed: Discussed with patient    Diagnosis:   Major depressive disorder, recurrent  Generalized anxiety disorder with panic attacks  ADD  Insomnia    Treatment Plan:  The current treatment plan consists of a follow-up visit in 6 weeks and then quarterly psychiatric sessions designed to evaluate her depression, anxiety and panic attacks, ADD, and insomnia.    Duration will be for a minimum of 12 months and will be reviewed at each visit.    Goals: Remission of depression with control of anxiety and panic attacks and improvement in ADD symptoms and sleep in order to prevent relapse due to the chronic nature of her behavioral health problems and mental illness.  Cymbalta has been discontinued.  Increase Adderall  to 30 mg twice daily.  Restart Xanax 0.5 mg 3 times daily.  Restart Prozac 20 mg a.m. blood it could be increased to 40 mg a.m.  Possible side effects discussed.    Brandon De Oliveira M.D.      This note was created using voice recognition software (Dragon). The accuracy of the dictation is limited by the abilities of the software. I have reviewed the note prior to signing, however some errors in grammar and context are still possible. If you have any questions related to this note please do not hesitate to contact our office.

## 2025-07-03 ENCOUNTER — APPOINTMENT (OUTPATIENT)
Dept: BEHAVIORAL HEALTH | Facility: CLINIC | Age: 60
End: 2025-07-03
Payer: MEDICAID

## 2025-07-14 ENCOUNTER — TELEPHONE (OUTPATIENT)
Dept: MEDICAL GROUP | Facility: CLINIC | Age: 60
End: 2025-07-14
Payer: MEDICAID

## 2025-07-14 ENCOUNTER — TELEPHONE (OUTPATIENT)
Dept: VASCULAR LAB | Facility: MEDICAL CENTER | Age: 60
End: 2025-07-14
Payer: MEDICAID

## 2025-07-14 PROCEDURE — RXMED WILLOW AMBULATORY MEDICATION CHARGE

## 2025-07-14 NOTE — TELEPHONE ENCOUNTER
Received Renewal request via [employer]  for   Semaglutide, 1 MG/DOSE, (OZEMPIC, 1 MG/DOSE,) 4 MG/3ML Solution Pen-injector. (Quantity:9 mls, Day Supply:90)     Insurance: [place]  Member ID:  [id number]  BIN: [id number]  PCN: [id number]  [id number]     Ran Test claim via [employer] & medication Rejects stating prior authorization is required.    ISSA Kumar, PhT  Vascular Pharmacy Liaison (Rx Coordinator)  P: [phone]  7/14/2025 10:30 AM

## 2025-07-14 NOTE — TELEPHONE ENCOUNTER
Pt opted in to use mail order and RXC services.     Pt also confirmed that she's been on this medication a long time now, and reports no side effects. Pt also confirmed that she normally administers her medication every Saturdays.     Informed pt that based on her insurance, they won't be able to cover this out not until 7/16. Scheduled pt's first initial delivery via morning  7/17.     RXC contact information has been provided verbally.     ISSA Kumar, PhT  Vascular Pharmacy Liaison (Rx Coordinator)  P: [phone]  7/14/2025 10:54 AM

## 2025-07-14 NOTE — TELEPHONE ENCOUNTER
----- Message from Physician Davonte Saenz M.D. sent at 7/12/2025 10:47 AM PDT -----  Hi All,     Can we please call patient to schedule appointment for medication refill and establish with a new provider.     Sincerely,   Dr. Saenz

## 2025-07-14 NOTE — TELEPHONE ENCOUNTER
Prior Authorization for Semaglutide, 1 MG/DOSE, (OZEMPIC, 1 MG/DOSE,) 4 MG/3ML Solution Pen-injector.  (Quantity: 9 mls, Days: 90) has been submitted via Cover My Meds: Key ([id number])    Insurance: [place] [employer]    Will follow up in 24-48 business hours.     ISSA Kumar, PhT  Vascular Pharmacy Liaison (Rx Coordinator)  P: [phone]  7/14/2025 10:30 AM

## 2025-07-14 NOTE — TELEPHONE ENCOUNTER
PA renewal submitted for Semaglutide, 1 MG/DOSE, (OZEMPIC, 1 MG/DOSE,) 4 MG/3ML Solution Pen-injector.  has been approved for a quantity of 9 mls , day supply 90    PA reference number: [id number]  Insurance: [place]  Effective dates: 07/14/2025-07/14/2026  Copay: fill too soon 7/16/2025     Is patient eligible to fill with [facility]? Yes    Next Steps: The Patients copay is less than $5.00. Will contact the patient to determine choice of pharmacy, if applicable.    ISSA Kumar, PhT  Vascular Pharmacy Liaison (Rx Coordinator)  P: [phone]  7/14/2025 10:38 AM

## 2025-07-17 ENCOUNTER — PHARMACY VISIT (OUTPATIENT)
Dept: PHARMACY | Facility: MEDICAL CENTER | Age: 60
End: 2025-07-17
Payer: COMMERCIAL

## 2025-07-27 DIAGNOSIS — M25.551 CHRONIC RIGHT HIP PAIN: ICD-10-CM

## 2025-07-27 DIAGNOSIS — J45.909 ASTHMA, UNSPECIFIED ASTHMA SEVERITY, UNSPECIFIED WHETHER COMPLICATED, UNSPECIFIED WHETHER PERSISTENT: ICD-10-CM

## 2025-07-27 DIAGNOSIS — K21.9 GASTROESOPHAGEAL REFLUX DISEASE, UNSPECIFIED WHETHER ESOPHAGITIS PRESENT: ICD-10-CM

## 2025-07-27 DIAGNOSIS — G43.809 OTHER MIGRAINE WITHOUT STATUS MIGRAINOSUS, NOT INTRACTABLE: ICD-10-CM

## 2025-07-27 DIAGNOSIS — Z84.0 FAMILY HISTORY OF PSORIATIC ARTHRITIS: ICD-10-CM

## 2025-07-27 DIAGNOSIS — G89.29 OTHER CHRONIC PAIN: ICD-10-CM

## 2025-07-27 DIAGNOSIS — G89.29 CHRONIC RIGHT HIP PAIN: ICD-10-CM

## 2025-07-27 DIAGNOSIS — M25.559 HIP PAIN: ICD-10-CM

## 2025-07-27 ASSESSMENT — RHEUMATOLOGY NEW PATIENT QUESTIONNAIRE
MUSCLE PAIN: Y
EYE PAIN: Y
DRY EYES: Y
MARK ALL THE AREAS OF PAIN: 123413311
COLD-INDUCED COLOR CHANGES (WHITE, PURPLE, RED ON REWARMING): FINGERS
JOINT SWELLING: Y
JOINT PAIN: SAME WITH ACTIVITY
TEMPLE PAIN: Y
VERTIGO: Y
JOINT INSTABILITY: Y
ANXIETY: Y
WEAKNESS: Y
NAUSEA: Y
DEPRESSION: Y
PELVIC PAIN: Y
DRY MOUTH: Y
BLURRY VISION: Y
THYROID PAIN: Y
COLD-INDUCED COLOR CHANGES (WHITE, PURPLE, RED ON REWARMING): TOES
BODY ACHES: Y
IRREGULAR HEARTBEATS: Y
TENDON PAIN: Y
NASAL CONGESTION: Y
BURNING: Y
EAR PAIN: Y
RATE_1TO10: 9
SPASMS: Y
HEARTBURN: Y
DURATION: >1 HOUR
CHARACTERISTIC: WORSE WITH ACTIVITY
NUMBNESS: Y
LYMPH NODE SWELLING: NECK
LYMPH NODE SWELLING: ARMPITS
HEADACHES: Y
TINGLING: Y

## 2025-07-28 RX ORDER — OMEPRAZOLE 40 MG/1
40 CAPSULE, DELAYED RELEASE ORAL DAILY
Qty: 30 CAPSULE | Refills: 0 | Status: SHIPPED | OUTPATIENT
Start: 2025-07-28

## 2025-07-28 RX ORDER — ALBUTEROL SULFATE 90 UG/1
2 INHALANT RESPIRATORY (INHALATION) EVERY 4 HOURS PRN
Qty: 8.5 EACH | Refills: 0 | Status: SHIPPED | OUTPATIENT
Start: 2025-07-28

## 2025-07-28 RX ORDER — SUMATRIPTAN 50 MG/1
50 TABLET, FILM COATED ORAL
Qty: 13 TABLET | Refills: 0 | Status: SHIPPED | OUTPATIENT
Start: 2025-07-28

## 2025-07-28 RX ORDER — CYCLOBENZAPRINE HCL 5 MG
5-10 TABLET ORAL 3 TIMES DAILY PRN
Qty: 30 TABLET | Refills: 0 | Status: SHIPPED | OUTPATIENT
Start: 2025-07-28

## 2025-07-28 RX ORDER — NAPROXEN 375 MG/1
375 TABLET ORAL
Qty: 30 TABLET | Refills: 0 | Status: SHIPPED
Start: 2025-07-28 | End: 2025-07-29

## 2025-07-28 NOTE — TELEPHONE ENCOUNTER
Received request via: Patient    Was the patient seen in the last year in this department? Yes    Does the patient have an active prescription (recently filled or refills available) for medication(s) requested? No    Pharmacy Name: [facility] - [place], [place] - [address].     Does the patient have [facility] Plus and need 100-day supply? (This applies to ALL medications) Patient does not have SCP

## 2025-07-28 NOTE — TELEPHONE ENCOUNTER
Received request via: Patient    Was the patient seen in the last year in this department? Yes    Does the patient have an active prescription (recently filled or refills available) for medication(s) requested? No    Pharmacy Name: [facility] - [place], [place] - [address]     Does the patient have longterm Plus and need 100-day supply? (This applies to ALL medications) Patient does not have SCP

## 2025-07-28 NOTE — TELEPHONE ENCOUNTER
Received request via: Patient    Was the patient seen in the last year in this department? Yes    Does the patient have an active prescription (recently filled or refills available) for medication(s) requested? No    Pharmacy Name: [facility] - [place], [place] - [address].     Does the patient have assisted Plus and need 100-day supply? (This applies to ALL medications) Patient does not have SCP

## 2025-07-28 NOTE — TELEPHONE ENCOUNTER
Received request via: Patient    Was the patient seen in the last year in this department? Yes    Does the patient have an active prescription (recently filled or refills available) for medication(s) requested? No    Pharmacy Name: [facility] - [place], [place] - [address].     Does the patient have senior living Plus and need 100-day supply? (This applies to ALL medications) Patient does not have SCP

## 2025-07-28 NOTE — TELEPHONE ENCOUNTER
Received request via: Patient    Was the patient seen in the last year in this department? Yes    Does the patient have an active prescription (recently filled or refills available) for medication(s) requested? No    Pharmacy Name: [facility] - [place], [place] - [address].     Does the patient have [facility] Plus and need 100-day supply? (This applies to ALL medications) Patient does not have SCP

## 2025-07-29 ENCOUNTER — OFFICE VISIT (OUTPATIENT)
Dept: RHEUMATOLOGY | Facility: MEDICAL CENTER | Age: 60
End: 2025-07-29
Attending: STUDENT IN AN ORGANIZED HEALTH CARE EDUCATION/TRAINING PROGRAM
Payer: MEDICAID

## 2025-07-29 VITALS
TEMPERATURE: 97.7 F | HEIGHT: 67 IN | SYSTOLIC BLOOD PRESSURE: 128 MMHG | DIASTOLIC BLOOD PRESSURE: 76 MMHG | HEART RATE: 107 BPM | RESPIRATION RATE: 16 BRPM | OXYGEN SATURATION: 93 % | BODY MASS INDEX: 25.9 KG/M2 | WEIGHT: 165 LBS

## 2025-07-29 DIAGNOSIS — M15.0 PRIMARY OSTEOARTHRITIS INVOLVING MULTIPLE JOINTS: ICD-10-CM

## 2025-07-29 DIAGNOSIS — F41.1 GENERALIZED ANXIETY DISORDER: ICD-10-CM

## 2025-07-29 DIAGNOSIS — F98.8 ATTENTION DEFICIT DISORDER WITHOUT HYPERACTIVITY: ICD-10-CM

## 2025-07-29 DIAGNOSIS — G89.29 CENTRAL SENSITIZATION TO PAIN: ICD-10-CM

## 2025-07-29 DIAGNOSIS — M13.80 SERONEGATIVE INFLAMMATORY ARTHRITIS: Primary | ICD-10-CM

## 2025-07-29 PROBLEM — M16.0 PRIMARY OSTEOARTHRITIS OF BOTH HIPS: Status: ACTIVE | Noted: 2025-07-29

## 2025-07-29 PROCEDURE — 99213 OFFICE O/P EST LOW 20 MIN: CPT | Performed by: STUDENT IN AN ORGANIZED HEALTH CARE EDUCATION/TRAINING PROGRAM

## 2025-07-29 RX ORDER — DEXTROAMPHETAMINE SACCHARATE, AMPHETAMINE ASPARTATE, DEXTROAMPHETAMINE SULFATE AND AMPHETAMINE SULFATE 7.5; 7.5; 7.5; 7.5 MG/1; MG/1; MG/1; MG/1
30 TABLET ORAL 2 TIMES DAILY
Qty: 60 TABLET | Refills: 0 | Status: SHIPPED | OUTPATIENT
Start: 2025-07-29 | End: 2025-08-28

## 2025-07-29 RX ORDER — ALPRAZOLAM 0.5 MG
0.5 TABLET ORAL 3 TIMES DAILY
Qty: 90 TABLET | Refills: 0 | Status: SHIPPED | OUTPATIENT
Start: 2025-07-29 | End: 2025-08-28

## 2025-07-29 RX ORDER — CELECOXIB 200 MG/1
200 CAPSULE ORAL
Qty: 60 CAPSULE | Refills: 5 | Status: SHIPPED | OUTPATIENT
Start: 2025-07-29

## 2025-07-29 ASSESSMENT — FIBROSIS 4 INDEX: FIB4 SCORE: 1.28

## 2025-07-29 NOTE — PATIENT INSTRUCTIONS
[facility]    Below are important guidelines to help you navigate your health care needs and assist us in caring for you safely and effectively. We encourage you to carefully read and understand this information and adhere to them accordingly.    Moku Messaging and Phone Calls:  Diagnosis and Treatment - For a detailed explanation of your condition and treatment plan from today's visit, refer to the visit note on Moku via the following steps:  Log in to Moku and click on “Visits” at the top.  Scroll down to “Past Visits” under Appointments.  Click on “View Notes” under the appropriate visit date.  Questions or Concerns - MyChart messaging is for non-urgent matters that do not require immediate attention and should be brief with no more than two questions or concerns. If you have multiple questions or concerns, we ask that you schedule an appointment to have them properly addressed.  Response to Messages - Moku messages are addressed throughout the week depending on clinical availability, so we ask that you allow up to one week for a response.  Phone Calls and Voicemails - Phone calls and voicemail messages are reserved for time-sensitive matters that cannot wait to be addressed via Moku. We ask that you refrain from calling the office multiple times or leaving multiple voicemails regarding the same issue as doing so may lead to delays in response time.  Urgent Issues - For urgent medical matters or medical emergencies that cannot wait, you are advised to go to your nearest Urgent Care or Emergency Department for immediate attention.    Laboratory Tests and Imaging Studies:  Future Lab and Imaging Orders - We ask that you get your lab tests and imaging studies done no later than one week before your follow-up visit unless instructed otherwise.  Results Communication - You may see some test results marked as “abnormal” that are not necessarily significant or concerning. If  there are significant abnormalities on your test results that warrant further action, you will be notified via MyChart or phone call, otherwise they will be addressed at your follow-up visit.    Prescriptions and Refill Requests:  General Prescriptions (e.g. prednisone, hydroxychloroquine, leflunomide, methotrexate, etc.) - These are sent to Retail Pharmacies, so all refill requests of these medications should be directed to your local pharmacy.  Specialty Prescriptions (e.g. Enbrel, Humira, Cosentyx, Xeljanz, etc.) - These are sent to Specialty Pharmacies, so all refill requests of these medications should be directed to your designated specialty pharmacy.  Infusion Prescriptions (e.g. Remicade, Simponi Aria, Rituxan, Saphnelo, etc.) - These are sent to Outpatient Infusion Centers, so all scheduling requests of these medications should be directed to your local infusion center.    Medication Risks and Adverse Effects:  Immunosuppressed Status - Steroids and antirheumatic drugs are immunosuppressants, so they increase the risk of infections and can have side effects on various organ systems in your body, though most of them are uncommon.  Potential Side Effects - Be sure to read the drug package inserts to learn about the potential side effects of your medications before you start taking them and take them exactly as prescribed unless instructed otherwise.  In Case of Side Effects - If you experience any significant side effects, stop taking the medication immediately and promptly notify the prescriber. Depending on the severity of the side effects, consider going to an Urgent Care or Emergency Department for immediate attention.    Immunizations and Health Screening:  Vaccinations - If you are on immunosuppressive therapy, it is important that you are up to date on age-appropriate immunizations, particularly shingles and pneumonia vaccines, which you can request from your primary care provider or from us at your  next appointment.  Screening Tests - It is also important that you are up to date on age-appropriate screening tests, such as pap smear, mammography, and colonoscopy, which you can request from your primary care provider.    Educational and Supportive Resources:  [facility] ([email or web address]) - Visit our website to learn more about your condition and other rheumatic diseases, and gain access to many helpful resources for them.  Disposal of Old Medications (www.sayda.gov/everyday-takeback-day) - Visit the Drug Enforcement Administration website to find a nearby location where you can properly dispose of old medications you no longer need.  Disposal of Used [place]riever (www.safeneedledisposal.org) - Visit the Safe Needle Disposal Organization website to find a nearby location where you can properly dispose of used needles from your injectable medications.

## 2025-07-29 NOTE — TELEPHONE ENCOUNTER
Received request via: Patient    Was the patient seen in the last year in this department? Yes    Does the patient have an active prescription (recently filled or refills available) for medication(s) requested? No    Pharmacy Name: [facility]    Does the patient have [facility] and need 100-day supply? (This applies to ALL medications) Patient does not have SCP

## 2025-07-29 NOTE — PROGRESS NOTES
[facility] RHEUMATOLOGY  [address], [address], [place] [address]  Phone: [phone] ? Fax: [phone]  [facility]/Health-Services/Rheumatology    NEW CONSULT VISIT NOTE         Subjective     DATE OF SERVICE: 07/29/2025    REFERRING PRACTITIONER:  Davonte Saenz M.D.  [address] [place] [address]    PATIENT IDENTIFICATION:  Chantale Gerer  [address] [place] [address]    YOB: 1965    MEDICAL RECORD NUMBER: [id number]         CHIEF COMPLAINT:   Chief Complaint   Patient presents with    New Patient     Psoriatic arthritis (HCC)       HISTORY OF PRESENT ILLNESS:  Chantale Greer is a 59 y.o. female with pertinent history notable for mild intermittent asthma, type 2 diabetes with peripheral neuropathy, attention deficit disorder, anxiety and depression among other comorbidities. Previously evaluated via rheumatology E-consult on 10/18/24, she presents for in-person evaluation of her unexplained joint/muscle pain and associated symptoms that raised concern for psoriatic arthritis given a family history of this disease in her mother and younger sister and a suspected personal history of psoriasis (reportedly with mild eczematous lesions on her occipital scalp and forearm that resolved spontaneously). Reports onset of symptoms several years prior with fluctuating course of joint pain in her hands (mostly MCP and PIP joints), wrists, elbows, shoulders, hips, knees, ankles, feet, neck/upper and mid/lower back, muscle pain all over her body, over 1 hour of morning stiffness that improves with activity, worsening pain on physical exertion, fatigue with muscle weakness, cold-induced red color changes on fingers/toes, dry eyes and dry mouth among multiple symptoms. She had been managing supportively without significant, but has noticed some improvement on gabapentin and cyclobenzaprine prescribed by her PCP. She had been prescribed Otezla for presumed psoriatic arthritis but that was  not approved by her insurance. Reports supplemental aspects of her symptoms and medical history as noted on the questionnaire below or scanned under media tab.    Pertinent treatments: Duloxetine (stopped due to severe drowsiness), gabapentin 600 mg 3 times daily (effective), cyclobenzaprine 5-10 mg 3 times daily PRN (helpful), celecoxib 200 mg 2 times daily PRN (ordered 7/29/25).    Pertinent positive labs: Elevated ALT 51 and  with normal ALT (in 4/2025).     Pertinent negative labs: Negative HBV, HCV, RF, and anti-CCP (in 6/2024), HLA-B27, STUART EIA, CRP and ESR (in 10/2024), anti-CarP, anti-MCV, eGFR, creatinine, and acceptable CBC (in 4/2025).     Pertinent XR imaging: Hands (in 10/2024) with new scattered erosions (versus subchondral cysts based on my review) and joint space narrowing primarily affecting MCP and PIP joints and right scaphoid all suggestive of inflammatory arthropathy; severe chronic left first CMC osteoarthritis and diminutive trapezium which could be posttraumatic or postsurgical. Hips (in 10/2024) with moderate osteoarthritis bilaterally demonstrated by spurring of femoral head/neck junction and subchondral cysts in the acetabulum.    [facility] [place]    7/27/2025 10:51 AM PDT - Filed by Patient   Demographic Information   Marital Status:    Occupation: On ssid   Highest Level of Education: 12th   MAIN REASON FOR VISIT Rymatoid arthritis, i. So much pain   HISTORY OF PRESENT ILLNESS   Date of symptom onset: Several yrs   Preceding incident/ailment: Arthitis aunto immune   Describe/list your symptoms: Every jount in my body hurts so bad, my ahoulders , elbows, feet , neuropathy an my hands knucklesache have no strength in them, fingers locking ip   Exacerbating factors:    Alleviating factors:    Helpful medications: Cyclobesuprine muscle relaxer bt hve no meds to rake for this   Ineffective medications:    Severity of pain (scale of 1-10): 9    Personal/emotional stressors: Pain males everything hard to cope with   Jose C All The Areas Of Pain    REVIEW OF SYMPTOMS    General   Fevers    Chills    Night sweats    Malaise    Fatigue    Unintentional weight loss    Musculoskeletal   Joint pain Same with activity   Morning stiffness duration >1 hour   Morning stiffness characteristic Worse with activity   Joint swelling Yes   Joint instability Yes   Tendon pain Yes   Muscle pain Yes   Body aches Yes   Dermatologic   Hair loss with bald spots    Hair shedding    Skin thickening    Skin plaques    Sunlight-induced skin rash    Cold-induced color changes (white, purple, red on rewarming) Fingers;  Toes   Neurologic/Psychiatric   Weakness Yes   Spasms Yes   Tingling Yes   Burning Yes   Numbness Yes   Insomnia    Anxiety Yes   Depression Yes   Head/Eyes   Headaches Yes   Temple pain Yes   Dizziness    Dry eyes Yes   Eye pain Yes   Eye redness    Blurry vision Yes   Vision loss    Ears/Nose   Ear pain Yes   Ringing in ears    Vertigo Yes   Hearing loss    Nasal ulcers    Nosebleeds    Sinus pain    Nasal congestion Yes   Snoring    Mouth/Throat   Oral ulcers    Bleeding gums    Dry mouth Yes   Cavities    Sore throat    Sticking in throat    Difficulty speaking    Neck/Lymphatics   Thyroid pain Yes   Thyroid swelling    Lymph node swelling Neck;  Armpits   Cardiac/Respiratory   Chest pain with breathing    Dry cough    Cough with bloody phlegm    Shortness of breath    Fast heartbeats    Irregular heartbeats Yes   Gastrointestinal   Nausea Yes   Vomiting    Difficulty swallowing    Heartburn Yes   Abdominal pain    Bloody stool    Mucus stool    Genitourinary   Pelvic pain Yes   Genital ulcers    Abnormal discharge    Burning urination    Frothy urine    Blood in urine    MEDICAL/PERSONAL HISTORY DETAILS   Current Medical Problems:    Past/Resolved Medical Problems:    Surgeries/Procedures (include month/year):    Prescription/Over-The-Counter/Herbal Medications:  Tylenol, natural pills for siactia pain down the outside of both hips to k ers, hip pain( old fracture) hurts so bad i cant walk around the block, upstairs atleast 4 days outta the week   Medication/Food/Material Allergies (include reactions):    Immunizations (include month/year)    Alcohol/Tobacco/Recreational Drugs:    Family Medical History (father, mother, siblings only): High blood pressure, heart problems stroke in family as well as cancer of all types       REVIEW OF SYSTEMS:  Except as noted in the history above, relevant review of systems with emphasis on autoimmune rheumatic diseases was otherwise negative.      CURRENT PROBLEM LIST:  Patient Active Problem List    Diagnosis Date Noted    Seronegative inflammatory arthritis 07/29/2025    Primary osteoarthritis involving multiple joints 07/29/2025    Central sensitization to pain 07/29/2025    Attention deficit disorder without hyperactivity 01/30/2025    Major depressive disorder, recurrent episode, moderate (HCC) 01/09/2025    Generalized anxiety disorder 01/09/2025    Primary insomnia 01/09/2025    Acute pharyngitis 11/18/2022    Nausea 11/04/2022    Dysuria 11/04/2022    Screening for colon cancer 11/04/2022    Asthma 11/04/2022    Family history of psoriatic arthritis 11/04/2022    Neuropathy 11/04/2022    Depression 05/17/2022    Back pain 05/17/2022    HTN (hypertension) 05/17/2022    Pain in both hands 05/17/2022    Pain, dental 02/24/2022    Hip pain 02/24/2022    Type 2 DM with diabetic neuropathy affecting both sides of body (HCC) 10/08/2021    Dehydration 08/17/2015       PAST MEDICAL HISTORY:  Past Medical History[1]    PAST SURGICAL HISTORY:  Past Surgical History[2]    SOCIAL HISTORY:  Social History     Socioeconomic History    Marital status:      Spouse name: Not on file    Number of children: Not on file    Years of education: Not on file    Highest education level: Not on file   Occupational History    Not on file   Tobacco Use     Smoking status: Former     Current packs/day: 0.00     Types: Cigarettes     Quit date:      Years since quittin.5    Smokeless tobacco: Never   Vaping Use    Vaping status: Former    Substances: Nicotine    Devices: Disposable   Substance and Sexual Activity    Alcohol use: Never    Drug use: Never    Sexual activity: Not on file   Other Topics Concern    Not on file   Social History Narrative    Not on file     Social Drivers of Health     Financial Resource Strain: Not on file   Food Insecurity: Not on file   Transportation Needs: Not on file   Physical Activity: Not on file   Stress: Not on file   Social Connections: Not on file   Intimate Partner Violence: Not on file   Housing Stability: Not on file       FAMILY HISTORY:  No family history on file.    MEDICATIONS:  Current Outpatient Medications   Medication Sig    amphetamine-dextroamphetamine (ADDERALL, 30MG,) 30 MG tablet Take 1 Tablet by mouth 2 times a day for 30 days.    FLUoxetine (PROZAC) 20 MG Cap Take 1 Capsule by mouth every day for 90 days.    ALPRAZolam (XANAX) 0.5 MG Tab Take 1 Tablet by mouth 3 times a day for 30 days.    celecoxib (CELEBREX) 200 MG Cap Take 1 Capsule by mouth 2 times daily with meals as needed (for arthritis).    albuterol (PROAIR HFA) 108 (90 Base) MCG/ACT Aero Soln inhalation aerosol Inhale 2 Puffs every four hours as needed for Shortness of Breath.    omeprazole (PRILOSEC) 40 MG delayed-release capsule Take 1 Capsule by mouth every day.    cyclobenzaprine (FLEXERIL) 5 MG tablet Take 1-2 Tablets by mouth 3 times a day as needed for Moderate Pain or Muscle Spasms.    SUMAtriptan (IMITREX) 50 MG Tab Take 1 Tablet by mouth one time as needed for Migraine for up to 1 dose.    lisinopril (PRINIVIL) 10 MG Tab TAKE ONE TABLET BY MOUTH EVERY DAY    Semaglutide, 1 MG/DOSE, (OZEMPIC, 1 MG/DOSE,) 4 MG/3ML Solution Pen-injector Inject 1 mg under the skin every 7 days.    insulin glargine (LANTUS SOLOSTAR) 100 UNIT/ML Solution  "Pen-injector injection Inject 15 Units under the skin every evening for 90 days.    Blood Glucose Meter Kit Test blood sugar as recommended by provider. Insurance preference blood glucose monitoring kit.    Blood Glucose Test Strips Use one strip to test blood sugar once daily early morning before first meal.    Lancets Use one lancet to test blood sugar once daily early morning before first meal.    Alcohol Swabs Wipe site with prep pad prior to injection.    gabapentin (NEURONTIN) 600 MG tablet Take 1 Tablet by mouth 3 times a day.    simvastatin (ZOCOR) 20 MG Tab Take 1 Tablet by mouth every evening.    Insulin Pen Needle (PEN NEEDLES) 31G X 6 MM Misc 1 Application as needed (to provide insulin).       ALLERGIES:   Allergies[3]    IMMUNIZATIONS:  Immunization History   Administered Date(s) Administered    Influenza Seasonal Injectable - Historical Data 11/09/2012    Influenza Vaccine Quad Inj (Pf) 11/03/2014, 10/12/2016, 10/12/2016, 10/13/2017, 10/13/2017, 09/14/2018, 10/18/2022, 10/02/2023    Influenza split virus trivalent (PF) 10/01/2024    PFIZER PURPLE CAP SARS-COV-2 VACCINATION (12+) 05/20/2021, 06/10/2021    Pneumococcal polysaccharide vaccine (PPSV-23) 08/18/2015    Tdap Vaccine 11/15/2019    Zoster Vaccine Recombinant (RZV) (SHINGRIX) 05/16/2023            Objective     Vital Signs: /76 (BP Location: Left arm, Patient Position: Sitting, BP Cuff Size: Large adult)   Pulse (!) 107   Temp 36.5 °C (97.7 °F) (Temporal)   Resp 16   Ht 1.702 m (5' 7\")   Wt 74.8 kg (165 lb)   SpO2 93% Body mass index is 25.84 kg/m².    General: Appears well and comfortable  Eyes: No scleral or conjunctival lesions  ENT: No apparent oral, nasal, or ear lesions  Head/Neck: No apparent scalp or neck lesions  Cardiovascular: Regular rate and rhythm; no pericardial rubs  Respiratory: Breathing quiet and unlabored; no rales or pleural rubs  Gastrointestinal: No apparent organomegaly or abdominal masses  Integumentary: No " significant cutaneous lesions or dyspigmentation  Musculoskeletal: Mild to moderate diffuse tenderness of the hands (worse on MCP and PIP squeeze), wrists, elbows, shoulders, hips, knees, ankles, feet (worse on MTP squeeze), and various muscle groups of upper/lower extremities, neck/upper and mid/lower back; no definite joint swelling, warmth, erythema, or overt synovitis; no significant restriction in range of motion of joints examined  Neurologic: No focal sensory or motor deficits  Psychiatric: Mood and affect appropriate      LABORATORY RESULTS REVIEWED AND INTERPRETED:  Lab Results   Component Value Date/Time    CREACTPROT <0.30 10/03/2024 02:26 PM    CREACTPROT 0.30 05/11/2018 03:08 PM    SEDRATEWES 16 10/03/2024 02:26 PM    SEDRATEWES 14 05/11/2018 03:08 PM     Lab Results   Component Value Date/Time    CREACTPROT <0.30 10/03/2024 02:26 PM    SEDRATEWES 16 10/03/2024 02:26 PM     Lab Results   Component Value Date/Time    RHEUMFACTN 10 06/19/2024 01:35 PM    CCPANTIBODY 3 05/11/2018 03:08 PM    CURX12ZAXN Negative 10/03/2024 02:26 PM     Lab Results   Component Value Date/Time    ANTINUCAB None Detected 10/03/2024 02:26 PM     Lab Results   Component Value Date/Time    TSHULTRASEN 1.600 06/19/2024 01:35 PM     Lab Results   Component Value Date/Time    IRON 53 06/19/2024 01:34 PM     Lab Results   Component Value Date/Time    WBC 9.3 04/15/2025 04:05 PM    RBC 5.02 04/15/2025 04:05 PM    HEMOGLOBIN 13.0 04/15/2025 04:05 PM    HEMATOCRIT 40.7 04/15/2025 04:05 PM    MCV 81.1 (L) 04/15/2025 04:05 PM    MCH 25.9 (L) 04/15/2025 04:05 PM    MCHC 31.9 (L) 04/15/2025 04:05 PM    RDW 41.9 04/15/2025 04:05 PM    PLATELETCT 245 04/15/2025 04:05 PM    MPV 11.1 04/15/2025 04:05 PM    NEUTS 4.65 04/15/2025 04:05 PM    LYMPHOCYTES 41.20 (H) 04/15/2025 04:05 PM    MONOCYTES 4.20 04/15/2025 04:05 PM    EOSINOPHILS 3.60 04/15/2025 04:05 PM    BASOPHILS 0.60 04/15/2025 04:05 PM     Lab Results   Component Value Date/Time     ASTSGOT 37 04/15/2025 04:05 PM    ASTSGOT 38 04/15/2025 04:05 PM    ALTSGPT 52 (H) 04/15/2025 04:05 PM    ALTSGPT 51 (H) 04/15/2025 04:05 PM    ALKPHOSPHAT 115 (H) 04/15/2025 04:05 PM    ALKPHOSPHAT 113 (H) 04/15/2025 04:05 PM    TBILIRUBIN 0.3 04/15/2025 04:05 PM    TBILIRUBIN 0.4 04/15/2025 04:05 PM    TOTPROTEIN 7.7 04/15/2025 04:05 PM    TOTPROTEIN 7.7 04/15/2025 04:05 PM    ALBUMIN 4.6 04/15/2025 04:05 PM    ALBUMIN 4.5 04/15/2025 04:05 PM     Lab Results   Component Value Date/Time    SODIUM 146 (H) 04/15/2025 04:05 PM    SODIUM 143 04/15/2025 04:05 PM    POTASSIUM 3.2 (L) 04/15/2025 04:05 PM    POTASSIUM 3.2 (L) 04/15/2025 04:05 PM    CHLORIDE 106 04/15/2025 04:05 PM    CHLORIDE 106 04/15/2025 04:05 PM    CO2 24 04/15/2025 04:05 PM    CO2 23 04/15/2025 04:05 PM    GLUCOSE 48 (LL) 04/15/2025 04:05 PM    GLUCOSE 51 (LL) 04/15/2025 04:05 PM    BUN 15 04/15/2025 04:05 PM    BUN 15 04/15/2025 04:05 PM    CREATININE 1.06 04/15/2025 04:05 PM    CREATININE 1.05 04/15/2025 04:05 PM    BUNCREATRAT 12.0 04/21/2022 09:34 PM    CALCIUM 10.1 04/15/2025 04:05 PM    CALCIUM 10.2 04/15/2025 04:05 PM    MAGNESIUM 2.3 08/17/2015 11:17 AM     Lab Results   Component Value Date/Time    MICROALBUR 4.7 04/15/2025 04:05 PM    CREATININEU 89.50 08/17/2015 06:07 PM    MALBCRT 41 (H) 04/15/2025 04:05 PM     Lab Results   Component Value Date/Time    COLORURINE Yellow 04/15/2025 04:05 PM    SPECGRAVITY 1.036 04/15/2025 04:05 PM    PHURINE 5.5 04/15/2025 04:05 PM    GLUCOSEUR >=1000 (A) 04/15/2025 04:05 PM    KETONES Negative 04/15/2025 04:05 PM    PROTEINURIN Negative 04/15/2025 04:05 PM     Lab Results   Component Value Date/Time    HEPBSAG Non-Reactive 06/19/2024 01:34 PM    HEPCAB Non-Reactive 06/19/2024 01:34 PM     Lab Results   Component Value Date/Time    CHOLSTRLTOT 182 06/19/2024 01:35 PM    LDL 95 06/19/2024 01:35 PM    HDL 72 06/19/2024 01:35 PM    TRIGLYCERIDE 74 06/19/2024 01:35 PM    HBA1C 10.5 (A) 01/14/2025 12:00 AM        RADIOLOGY RESULTS REVIEWED AND INTERPRETED:  Results for orders placed during the hospital encounter of 10/03/24    DX-JOINT SURVEY-HANDS SINGLE VIEW    Impression  New scattered erosions and joint space narrowing primarily affecting MCP and PIPs. There is also some spurring. This suggest inflammatory arthropathy such as psoriasis or rheumatoid arthritis    Severe chronic left first CMC osteoarthritis with likely remote traumatic injury    Results for orders placed during the hospital encounter of 07/24/24    DS-BONE DENSITY STUDY (DEXA)    Impression  According to the World Health Organization classification, bone mineral density of this patient is normal for both lumbar spine and proximal right femur.    10-year Probability of Fracture:  Major Osteoporotic     12.8%  Hip     0.3%  Population      [place] ()    Based on right femur neck BMD      All relevant laboratory and imaging results reported on this note were reviewed and interpreted by me.         Assessment & Plan     Chantale Greer is a 59 y.o. female with history and physical as noted above whose presentation merits the following clinical impressions and recommendations:    1. Seronegative inflammatory arthritis  Presentation suggests differentials including psoriatic arthritis and seronegative rheumatoid arthritis. Psoriatic arthritis is suggested by her reported history of apparent mild psoriasis that completely disappeared, diffuse dactylitis that self resolved, and family history of psoriasis with psoriatic arthritis in the mother and younger sister. Seronegative rheumatoid arthritis is suggested by her pattern of joint involvement including MCP and PIP joints. In any case, overall clinical picture is confounded by her central sensitization to pain, so reasonable at this time to initiate NSAID therapy with celecoxib and follow clinically.  - CRP QUANTITIVE (NON-CARDIAC); Future  - Sed Rate; Future  - CBC WITH DIFFERENTIAL; Future  - Comp  Metabolic Panel; Future  - celecoxib (CELEBREX) 200 MG Cap; Take 1 Capsule by mouth 2 times daily with meals as needed (for arthritis).  Dispense: 60 Capsule; Refill: 5    2. Primary osteoarthritis involving multiple joints  Significant etiology of biomechanical arthralgia of the hands and hips which can be managed supportively.  - Tylenol Arthritis and Voltaren 1% gel with or without oral NSAIDs as needed  - Consider intra-articular steroid injection if that becomes necessary    3. Central sensitization to pain  Clinical picture based on her symptomatology of which myofascial pain is on the mild end of the spectrum and fibromyalgia is on the severe end of the spectrum. The etiology is thought to be due to perturbations in the neuroendocrine or pain pathways of the central nervous system typically in individuals with predisposing conditions rooted in the central nervous system. This most commonly occurs in individuals with anxiety, depression, or other mood disturbances, but can also be triggered by a major emotional and/or physical trauma, serious ailment requiring significant medical and/or surgical intervention, and perpetuated by central or obstructive sleep apnea. This is often a diagnosis of exclusion, but may occur in the setting of an underlying chronic systemic disease, particularly a hormonal imbalance, which may alter the neuroendocrine pain pathways. In general, management typically entails engagement in low intensity low impact aerobic exercise and treatment with a neuromodulator and muscle relaxant.  - Continue gabapentin 600 mg 3 times daily  - Continue cyclobenzaprine 5-10 mg 3 times daily PRN  - Consider polysomnography for possible central or obstructive sleep apnea      The above assessment and plan were discussed with the patient who acknowledged understanding of the plan.    FOLLOW-UP: Return in about 3 months (around 10/29/2025) for Short.      Note: 75 minutes were spent on this encounter  including extensive chart review for data acquisition and interpretation, educating and counseling the patient about her condition, treatment, prognosis, and care coordination for follow-up visit.           Thank you for the opportunity to participate in the care of Chantale Greer.    Nghia Crandall MD, MS, FACR  Rheumatologist, [facility] Rheumatology, [facility]   of Clinical Medicine, Department of Internal Medicine  [employer]       [1]   Past Medical History:  Diagnosis Date    Diabetes (HCC)     Diabetic neuropathy, painful (HCC)     Hypertension    [2]   Past Surgical History:  Procedure Laterality Date    GYN SURGERY      c section, tubal    OTHER ABDOMINAL SURGERY      gastric sleeve   [3]   Allergies  Allergen Reactions    Sulfa Drugs Vomiting

## 2025-08-25 ENCOUNTER — OFFICE VISIT (OUTPATIENT)
Dept: BEHAVIORAL HEALTH | Facility: CLINIC | Age: 60
End: 2025-08-25
Payer: MEDICAID

## 2025-08-25 DIAGNOSIS — F41.1 GENERALIZED ANXIETY DISORDER WITH PANIC ATTACKS: ICD-10-CM

## 2025-08-25 DIAGNOSIS — F41.1 GENERALIZED ANXIETY DISORDER: ICD-10-CM

## 2025-08-25 DIAGNOSIS — F98.8 ATTENTION DEFICIT DISORDER WITHOUT HYPERACTIVITY: ICD-10-CM

## 2025-08-25 DIAGNOSIS — F51.01 PRIMARY INSOMNIA: ICD-10-CM

## 2025-08-25 DIAGNOSIS — F33.1 MAJOR DEPRESSIVE DISORDER, RECURRENT EPISODE, MODERATE (HCC): Primary | ICD-10-CM

## 2025-08-25 DIAGNOSIS — F41.0 GENERALIZED ANXIETY DISORDER WITH PANIC ATTACKS: ICD-10-CM

## 2025-08-25 PROCEDURE — 99214 OFFICE O/P EST MOD 30 MIN: CPT | Performed by: PSYCHIATRY & NEUROLOGY

## 2025-08-25 RX ORDER — ALPRAZOLAM 0.5 MG
0.5 TABLET ORAL 3 TIMES DAILY
Qty: 90 TABLET | Refills: 0 | Status: SHIPPED | OUTPATIENT
Start: 2025-08-25 | End: 2025-08-26 | Stop reason: SDUPTHER

## 2025-08-25 RX ORDER — FLUOXETINE HYDROCHLORIDE 40 MG/1
40 CAPSULE ORAL DAILY
Qty: 90 CAPSULE | Refills: 0 | Status: SHIPPED | OUTPATIENT
Start: 2025-08-25 | End: 2025-11-23

## 2025-08-25 RX ORDER — DEXTROAMPHETAMINE SACCHARATE, AMPHETAMINE ASPARTATE, DEXTROAMPHETAMINE SULFATE AND AMPHETAMINE SULFATE 7.5; 7.5; 7.5; 7.5 MG/1; MG/1; MG/1; MG/1
30 TABLET ORAL 2 TIMES DAILY
Qty: 60 TABLET | Refills: 0 | Status: SHIPPED | OUTPATIENT
Start: 2025-08-25 | End: 2025-08-26 | Stop reason: SDUPTHER

## 2025-08-26 DIAGNOSIS — F98.8 ATTENTION DEFICIT DISORDER WITHOUT HYPERACTIVITY: ICD-10-CM

## 2025-08-26 DIAGNOSIS — F41.1 GENERALIZED ANXIETY DISORDER: ICD-10-CM

## 2025-08-27 RX ORDER — ALPRAZOLAM 0.5 MG
0.5 TABLET ORAL 3 TIMES DAILY
Qty: 90 TABLET | Refills: 0 | Status: SHIPPED | OUTPATIENT
Start: 2025-08-27 | End: 2025-09-26

## 2025-08-27 RX ORDER — DEXTROAMPHETAMINE SACCHARATE, AMPHETAMINE ASPARTATE, DEXTROAMPHETAMINE SULFATE AND AMPHETAMINE SULFATE 7.5; 7.5; 7.5; 7.5 MG/1; MG/1; MG/1; MG/1
30 TABLET ORAL 2 TIMES DAILY
Qty: 60 TABLET | Refills: 0 | Status: SHIPPED | OUTPATIENT
Start: 2025-08-27 | End: 2025-09-26

## 2025-09-03 ENCOUNTER — APPOINTMENT (OUTPATIENT)
Dept: BEHAVIORAL HEALTH | Facility: CLINIC | Age: 60
End: 2025-09-03
Payer: MEDICAID